# Patient Record
Sex: FEMALE | Race: WHITE | NOT HISPANIC OR LATINO | Employment: OTHER | URBAN - METROPOLITAN AREA
[De-identification: names, ages, dates, MRNs, and addresses within clinical notes are randomized per-mention and may not be internally consistent; named-entity substitution may affect disease eponyms.]

---

## 2019-12-03 ENCOUNTER — OFFICE VISIT (OUTPATIENT)
Dept: FAMILY MEDICINE | Facility: CLINIC | Age: 69
End: 2019-12-03
Payer: MEDICARE

## 2019-12-03 VITALS
DIASTOLIC BLOOD PRESSURE: 64 MMHG | HEART RATE: 75 BPM | TEMPERATURE: 99 F | HEIGHT: 57 IN | OXYGEN SATURATION: 99 % | WEIGHT: 150.38 LBS | BODY MASS INDEX: 32.44 KG/M2 | RESPIRATION RATE: 18 BRPM | SYSTOLIC BLOOD PRESSURE: 110 MMHG

## 2019-12-03 DIAGNOSIS — H25.9 AGE-RELATED CATARACT OF BOTH EYES, UNSPECIFIED AGE-RELATED CATARACT TYPE: Primary | ICD-10-CM

## 2019-12-03 DIAGNOSIS — Z12.31 SCREENING MAMMOGRAM, ENCOUNTER FOR: ICD-10-CM

## 2019-12-03 DIAGNOSIS — Z01.818 PREOP EXAM FOR INTERNAL MEDICINE: ICD-10-CM

## 2019-12-03 PROCEDURE — 1159F PR MEDICATION LIST DOCUMENTED IN MEDICAL RECORD: ICD-10-PCS | Mod: HCNC,S$GLB,, | Performed by: INTERNAL MEDICINE

## 2019-12-03 PROCEDURE — 1159F MED LIST DOCD IN RCRD: CPT | Mod: HCNC,S$GLB,, | Performed by: INTERNAL MEDICINE

## 2019-12-03 PROCEDURE — 1101F PT FALLS ASSESS-DOCD LE1/YR: CPT | Mod: HCNC,CPTII,S$GLB, | Performed by: INTERNAL MEDICINE

## 2019-12-03 PROCEDURE — 99999 PR PBB SHADOW E&M-NEW PATIENT-LVL IV: ICD-10-PCS | Mod: PBBFAC,HCNC,, | Performed by: INTERNAL MEDICINE

## 2019-12-03 PROCEDURE — 1126F PR PAIN SEVERITY QUANTIFIED, NO PAIN PRESENT: ICD-10-PCS | Mod: HCNC,S$GLB,, | Performed by: INTERNAL MEDICINE

## 2019-12-03 PROCEDURE — 1101F PR PT FALLS ASSESS DOC 0-1 FALLS W/OUT INJ PAST YR: ICD-10-PCS | Mod: HCNC,CPTII,S$GLB, | Performed by: INTERNAL MEDICINE

## 2019-12-03 PROCEDURE — 99999 PR PBB SHADOW E&M-NEW PATIENT-LVL IV: CPT | Mod: PBBFAC,HCNC,, | Performed by: INTERNAL MEDICINE

## 2019-12-03 PROCEDURE — 99213 PR OFFICE/OUTPT VISIT, EST, LEVL III, 20-29 MIN: ICD-10-PCS | Mod: HCNC,S$GLB,, | Performed by: INTERNAL MEDICINE

## 2019-12-03 PROCEDURE — 1126F AMNT PAIN NOTED NONE PRSNT: CPT | Mod: HCNC,S$GLB,, | Performed by: INTERNAL MEDICINE

## 2019-12-03 PROCEDURE — 99213 OFFICE O/P EST LOW 20 MIN: CPT | Mod: HCNC,S$GLB,, | Performed by: INTERNAL MEDICINE

## 2019-12-03 RX ORDER — LISINOPRIL 20 MG/1
20 TABLET ORAL DAILY
COMMUNITY
End: 2020-04-29

## 2019-12-03 RX ORDER — AMLODIPINE BESYLATE 5 MG/1
TABLET ORAL DAILY
Refills: 3 | COMMUNITY
Start: 2019-10-31 | End: 2022-02-11 | Stop reason: SDUPTHER

## 2019-12-03 RX ORDER — LATANOPROST 50 UG/ML
SOLUTION/ DROPS OPHTHALMIC
Refills: 0 | COMMUNITY
Start: 2019-09-18

## 2019-12-03 RX ORDER — HYDROGEN PEROXIDE 3 %
20 SOLUTION, NON-ORAL MISCELLANEOUS
COMMUNITY

## 2019-12-03 RX ORDER — CITALOPRAM 20 MG/1
TABLET, FILM COATED ORAL
COMMUNITY
Start: 2017-06-06 | End: 2020-03-25 | Stop reason: SDUPTHER

## 2019-12-03 NOTE — PROGRESS NOTES
[facility] Note    Chief Complaint      Chief Complaint   Patient presents with    Pre-op Exam       History of Present Illness      Merry Terrazas is a 69 y.o. female who presents today for   Chief Complaint   Patient presents with    Pre-op Exam   .  Patient comes to appointment for preop eval for cataract removal with dr mathieu mccann . See precompleted clearence form filled out by me .     Problem List Items Addressed This Visit        Ophtho    Age-related cataract of both eyes - Primary    Overview     Removal with dr mccann preop assessment done             Renal/    Screening mammogram, encounter for    Overview     Ordered             Other    Preop exam for internal medicine    Overview     See precompleted clearence form . Cleared for procedure with low risk cardiac complications                  Past Medical History:  No past medical history on file.    Past Surgical History:  Past Surgical History:   Procedure Laterality Date     SECTION         Family History:  family history includes Hypertension in her maternal grandmother and mother; Stroke in her maternal grandmother.    Social History:  Social History     Socioeconomic History    Marital status:      Spouse name: Not on file    Number of children: Not on file    Years of education: Not on file    Highest education level: Not on file   Occupational History    Not on file   Social Needs    Financial resource strain: Not hard at all    Food insecurity:     Worry: Never true     Inability: Never true    Transportation needs:     Medical: No     Non-medical: No   Tobacco Use    Smoking status: Never Smoker    Smokeless tobacco: Never Used   Substance and Sexual Activity    Alcohol use: Yes     Frequency: 2-4 times a month     Drinks per session: 1 or 2    Drug use: Never    Sexual activity: Yes     Partners: Male   Lifestyle    Physical activity:     Days per week: 5 days     Minutes per  session: 40 min    Stress: Not at all   Relationships    Social connections:     Talks on phone: More than three times a week     Gets together: More than three times a week     Attends Restoration service: Never     Active member of club or organization: No     Attends meetings of clubs or organizations: Never     Relationship status:    Other Topics Concern    Not on file   Social History Narrative    Not on file       Review of Systems:   Review of Systems   Constitutional: Negative for fever and weight loss.   HENT: Negative for congestion, hearing loss and sore throat.    Eyes: Positive for blurred vision.   Respiratory: Negative for cough and shortness of breath.    Cardiovascular: Negative for chest pain, palpitations, claudication and leg swelling.   Gastrointestinal: Negative for abdominal pain, constipation, diarrhea and heartburn.   Genitourinary: Negative for dysuria.   Musculoskeletal: Negative for back pain and myalgias.   Skin: Negative for rash.   Neurological: Negative for focal weakness and headaches.   Psychiatric/Behavioral: Negative for depression and suicidal ideas. The patient is not nervous/anxious.          Medications:  Outpatient Encounter Medications as of 12/3/2019   Medication Sig Dispense Refill    amLODIPine (NORVASC) 5 MG tablet   3    citalopram (CELEXA) 20 MG tablet Take by mouth.      esomeprazole (NEXIUM) 20 MG capsule Take 20 mg by mouth before breakfast.      latanoprost 0.005 % ophthalmic solution   0    lisinopril (PRINIVIL,ZESTRIL) 20 MG tablet Take 20 mg by mouth once daily.      vit C/vit E acet/lutein/min (OCUVITE LUTEIN ORAL) Take by mouth.       No facility-administered encounter medications on file as of 12/3/2019.         Allergies:  Review of patient's allergies indicates:  No Known Allergies      Physical Exam      Vitals:    12/03/19 1104   BP: 110/64   Pulse: 75   Resp: 18   Temp: 98.6 °F (37 °C)      Body mass index is 32.54 kg/m².    Physical Exam    Constitutional: She is oriented to person, place, and time. She appears well-developed and well-nourished.   HENT:   Mouth/Throat: Oropharynx is clear and moist.   Eyes: Pupils are equal, round, and reactive to light. EOM are normal.   Neck: Normal range of motion. No thyromegaly present.   Cardiovascular: Normal rate and normal heart sounds. Exam reveals no gallop and no friction rub.   No murmur heard.  Pulmonary/Chest: Breath sounds normal.   Abdominal: Soft. Bowel sounds are normal.   Musculoskeletal: Normal range of motion.   Lymphadenopathy:     She has no cervical adenopathy.   Neurological: She is alert and oriented to person, place, and time. No cranial nerve deficit.   Skin: Skin is warm. No rash noted.   Psychiatric: She has a normal mood and affect. Her behavior is normal.        Laboratory:  CBC:  No results for input(s): WBC, RBC, HGB, HCT, PLT, MCV, MCH, MCHC in the last 2160 hours.  CMP:  No results for input(s): GLU, CALCIUM, ALBUMIN, PROT, NA, K, CO2, CL, BUN, ALKPHOS, ALT, AST, BILITOT in the last 2160 hours.    Invalid input(s): CREATININ  URINALYSIS:  No results for input(s): COLORU, CLARITYU, SPECGRAV, PHUR, PROTEINUA, GLUCOSEU, BILIRUBINCON, BLOODU, WBCU, RBCU, BACTERIA, MUCUS, NITRITE, LEUKOCYTESUR, UROBILINOGEN, HYALINECASTS in the last 2160 hours.   LIPIDS:  No results for input(s): TSH, HDL, CHOL, TRIG, LDLCALC, CHOLHDL, NONHDLCHOL, TOTALCHOLEST in the last 2160 hours.  TSH:  No results for input(s): TSH in the last 2160 hours.  A1C:  No results for input(s): HGBA1C in the last 2160 hours.    Radiology:        Assessment:     Merry Terrazas is a 69 y.o.female with:    Age-related cataract of both eyes, unspecified age-related cataract type    Preop exam for internal medicine    Screening mammogram, encounter for  -     Mammo Digital Screening Bilat; Future; Expected date: 12/03/2019          Plan:     Problem List Items Addressed This Visit        Ophtho    Age-related cataract of  both eyes - Primary    Overview     Removal with dr mccann preop assessment done             Renal/    Screening mammogram, encounter for    Overview     Ordered             Other    Preop exam for internal medicine    Overview     See precompleted clearence form . Cleared for procedure with low risk cardiac complications                As above, continue current medications and maintain follow up with specialists.  Return to clinic in 3 months.    Frederick W Dantagnan [facility] [place]

## 2020-03-25 RX ORDER — CITALOPRAM 20 MG/1
20 TABLET, FILM COATED ORAL DAILY
Qty: 30 TABLET | Refills: 5 | Status: SHIPPED | OUTPATIENT
Start: 2020-03-25 | End: 2020-10-01 | Stop reason: SDUPTHER

## 2020-04-28 NOTE — TELEPHONE ENCOUNTER
Called patient to see which dose of lisinopril she is taking is it the plain 20 mg or lisinopril-hctz 20-12.5

## 2020-04-29 RX ORDER — LISINOPRIL AND HYDROCHLOROTHIAZIDE 12.5; 2 MG/1; MG/1
1 TABLET ORAL DAILY
Qty: 90 TABLET | Refills: 3 | Status: SHIPPED | OUTPATIENT
Start: 2020-04-29 | End: 2021-04-30

## 2020-05-08 DIAGNOSIS — Z12.11 COLON CANCER SCREENING: ICD-10-CM

## 2020-10-01 RX ORDER — CITALOPRAM 20 MG/1
20 TABLET, FILM COATED ORAL DAILY
Qty: 30 TABLET | Refills: 5 | Status: SHIPPED | OUTPATIENT
Start: 2020-10-01 | End: 2021-04-30

## 2020-11-09 ENCOUNTER — OFFICE VISIT (OUTPATIENT)
Dept: FAMILY MEDICINE | Facility: CLINIC | Age: 70
End: 2020-11-09
Payer: MEDICARE

## 2020-11-09 VITALS
BODY MASS INDEX: 31.99 KG/M2 | DIASTOLIC BLOOD PRESSURE: 84 MMHG | HEART RATE: 77 BPM | WEIGHT: 148.25 LBS | HEIGHT: 57 IN | SYSTOLIC BLOOD PRESSURE: 144 MMHG | TEMPERATURE: 98 F | OXYGEN SATURATION: 99 % | RESPIRATION RATE: 18 BRPM

## 2020-11-09 DIAGNOSIS — Z11.59 ENCOUNTER FOR HEPATITIS C SCREENING TEST FOR LOW RISK PATIENT: ICD-10-CM

## 2020-11-09 DIAGNOSIS — Z13.6 ENCOUNTER FOR LIPID SCREENING FOR CARDIOVASCULAR DISEASE: ICD-10-CM

## 2020-11-09 DIAGNOSIS — Z78.0 POST-MENOPAUSAL: ICD-10-CM

## 2020-11-09 DIAGNOSIS — Z00.00 ANNUAL PHYSICAL EXAM: Primary | ICD-10-CM

## 2020-11-09 DIAGNOSIS — Z13.220 ENCOUNTER FOR LIPID SCREENING FOR CARDIOVASCULAR DISEASE: ICD-10-CM

## 2020-11-09 DIAGNOSIS — I12.9 HYPERTENSION, RENAL DISEASE, STAGE 1-4 OR UNSPECIFIED CHRONIC KIDNEY DISEASE: ICD-10-CM

## 2020-11-09 PROCEDURE — 99397 PR PREVENTIVE VISIT,EST,65 & OVER: ICD-10-PCS | Mod: HCNC,S$GLB,, | Performed by: INTERNAL MEDICINE

## 2020-11-09 PROCEDURE — 99397 PER PM REEVAL EST PAT 65+ YR: CPT | Mod: HCNC,S$GLB,, | Performed by: INTERNAL MEDICINE

## 2020-11-09 PROCEDURE — 99999 PR PBB SHADOW E&M-EST. PATIENT-LVL III: CPT | Mod: PBBFAC,HCNC,, | Performed by: INTERNAL MEDICINE

## 2020-11-09 PROCEDURE — 99999 PR PBB SHADOW E&M-EST. PATIENT-LVL III: ICD-10-PCS | Mod: PBBFAC,HCNC,, | Performed by: INTERNAL MEDICINE

## 2020-11-09 NOTE — PROGRESS NOTES
[facility] Note    Chief Complaint      Chief Complaint   Patient presents with    Annual Exam       History of Present Illness      Merry Terrazas is a 70 y.o. female who presents today for   Chief Complaint   Patient presents with    Annual Exam   .  Patient comes to appointment here for annual [employer] wellness exam . She is currently feeling well. Is seeing dr velasquez gi , dr mariee renal , dr mccann optho , she is completely independent of all adls . She denies difficulty with ambulating both in and out of home . She denies hearting loss , no depression symptoms. She is uptodate with all vaccines . She is uptodate with colonoscopy with dr velasquez. She active with [employer] 4 days week .     Problem List Items Addressed This Visit        Cardiac/Vascular    Encounter for lipid screening for cardiovascular disease    Overview     Check lipid panel today            Renal/    Hypertension, renal disease, stage 1-4 or unspecified chronic kidney disease    Overview     Seeing renal . Cont current meds . bp stable            Other    Annual physical exam - Primary    Overview     pe documented . Will review vaccines . cont all specialist f/u . Will get full labs as well            Other Visit Diagnoses     Post-menopausal        Encounter for hepatitis C screening test for low risk patient                Past Medical History:  History reviewed. No pertinent past medical history.    Past Surgical History:  Past Surgical History:   Procedure Laterality Date     SECTION         Family History:  family history includes Hypertension in her maternal grandmother and mother; Stroke in her maternal grandmother.    Social History:  Social History     Socioeconomic History    Marital status:      Spouse name: Not on file    Number of children: Not on file    Years of education: Not on file    Highest education level: Not on file   Occupational History    Not on file    Social Needs    Financial resource strain: Not hard at all    Food insecurity     Worry: Never true     Inability: Never true    Transportation needs     Medical: No     Non-medical: No   Tobacco Use    Smoking status: Never Smoker    Smokeless tobacco: Never Used   Substance and Sexual Activity    Alcohol use: Yes     Frequency: 2-4 times a month     Drinks per session: 1 or 2    Drug use: Never    Sexual activity: Yes     Partners: Male   Lifestyle    Physical activity     Days per week: 5 days     Minutes per session: 40 min    Stress: Not at all   Relationships    Social connections     Talks on phone: More than three times a week     Gets together: More than three times a week     Attends Buddhist service: Never     Active member of club or organization: No     Attends meetings of clubs or organizations: Never     Relationship status:    Other Topics Concern    Not on file   Social History Narrative    Not on file       Review of Systems:   Review of Systems   Constitutional: Negative for fever and weight loss.   HENT: Negative for congestion, hearing loss and sore throat.    Eyes: Negative for blurred vision.   Respiratory: Negative for cough and shortness of breath.    Cardiovascular: Negative for chest pain, palpitations, claudication and leg swelling.   Gastrointestinal: Negative for abdominal pain, constipation, diarrhea and heartburn.   Genitourinary: Negative for dysuria.   Musculoskeletal: Negative for back pain and myalgias.   Skin: Negative for rash.   Neurological: Negative for focal weakness and headaches.   Psychiatric/Behavioral: Negative for depression and suicidal ideas. The patient is not nervous/anxious.          Medications:  Outpatient Encounter Medications as of 11/9/2020   Medication Sig Dispense Refill    amLODIPine (NORVASC) 5 MG tablet   3    citalopram (CELEXA) 20 MG tablet Take 1 tablet (20 mg total) by mouth once daily. 30 tablet 5    esomeprazole (NEXIUM)  20 MG capsule Take 20 mg by mouth before breakfast.      latanoprost 0.005 % ophthalmic solution   0    lisinopriL-hydrochlorothiazide (PRINZIDE,ZESTORETIC) 20-12.5 mg per tablet Take 1 tablet by mouth once daily. 90 tablet 3    vit C/vit E acet/lutein/min (OCUVITE LUTEIN ORAL) Take by mouth.       No facility-administered encounter medications on file as of 11/9/2020.         Allergies:  Review of patient's allergies indicates:  No Known Allergies      Physical Exam      Vitals:    11/09/20 0956   BP: (!) 144/84   Pulse: 77   Resp: 18   Temp: 97.6 °F (36.4 °C)      Body mass index is 32.08 kg/m².    Physical Exam  Constitutional:       Appearance: She is well-developed.   Eyes:      Pupils: Pupils are equal, round, and reactive to light.   Neck:      Musculoskeletal: Normal range of motion.      Thyroid: No thyromegaly.   Cardiovascular:      Rate and Rhythm: Normal rate.      Heart sounds: Normal heart sounds. No murmur. No friction rub. No gallop.    Pulmonary:      Breath sounds: Normal breath sounds.   Abdominal:      General: Bowel sounds are normal.      Palpations: Abdomen is soft.   Musculoskeletal: Normal range of motion.   Lymphadenopathy:      Cervical: No cervical adenopathy.   Skin:     General: Skin is warm.      Findings: No rash.   Neurological:      Mental Status: She is alert and oriented to person, place, and time.      Cranial Nerves: No cranial nerve deficit.   Psychiatric:         Behavior: Behavior normal.          Laboratory:  CBC:  No results for input(s): WBC, RBC, HGB, HCT, PLT, MCV, MCH, MCHC in the last 2160 hours.  CMP:  No results for input(s): GLU, CALCIUM, ALBUMIN, PROT, NA, K, CO2, CL, BUN, ALKPHOS, ALT, AST, BILITOT in the last 2160 hours.    Invalid input(s): CREATININ  URINALYSIS:  No results for input(s): COLORU, CLARITYU, SPECGRAV, PHUR, PROTEINUA, GLUCOSEU, BILIRUBINCON, BLOODU, WBCU, RBCU, BACTERIA, MUCUS, NITRITE, LEUKOCYTESUR, UROBILINOGEN, HYALINECASTS in the last 2160  hours.   LIPIDS:  No results for input(s): TSH, HDL, CHOL, TRIG, LDLCALC, CHOLHDL, NONHDLCHOL, TOTALCHOLEST in the last 2160 hours.  TSH:  No results for input(s): TSH in the last 2160 hours.  A1C:  No results for input(s): HGBA1C in the last 2160 hours.    Radiology:        Assessment:     Merry Terrazas is a 70 y.o.female with:    Annual physical exam    Hypertension, renal disease, stage 1-4 or unspecified chronic kidney disease  -     CBC Auto Differential; Future; Expected date: 11/09/2020    Encounter for lipid screening for cardiovascular disease  -     Comprehensive Metabolic Panel; Future; Expected date: 11/09/2020  -     Lipid Panel; Future; Expected date: 11/09/2020    Post-menopausal  -     DXA Bone Density Spine And Hip; Future; Expected date: 11/09/2020    Encounter for hepatitis C screening test for low risk patient  -     Hepatitis C Antibody; Future; Expected date: 11/09/2020          Plan:     Problem List Items Addressed This Visit        Cardiac/Vascular    Encounter for lipid screening for cardiovascular disease    Overview     Check lipid panel today            Renal/    Hypertension, renal disease, stage 1-4 or unspecified chronic kidney disease    Overview     Seeing renal . Cont current meds . bp stable            Other    Annual physical exam - Primary    Overview     pe documented . Will review vaccines . cont all specialist f/u . Will get full labs as well            Other Visit Diagnoses     Post-menopausal        Encounter for hepatitis C screening test for low risk patient              As above, continue current medications and maintain follow up with specialists.  Return to clinic in 6  months.      Frederick W Dantagnan [facility] [place]

## 2020-11-18 LAB
ALBUMIN: 4.5 GRAM/DL (ref 3.5–5)
ALP SERPL-CCNC: 51 UNIT/L (ref 38–126)
ALT SERPL W P-5'-P-CCNC: 18 UNIT/L (ref 7–56)
ANION GAP SERPL CALC-SCNC: 17 MEQ/L (ref 9–18)
AST SERPL-CCNC: 21 UNIT/L (ref 7–40)
BASOPHILS ABSOLUTE COUNT: 0 K/UL (ref 0–0.2)
BASOPHILS NFR BLD: 0.6 % (ref 0–2)
BILIRUB SERPL-MCNC: 0.4 MG/DL (ref 0–1.2)
BUN BLD-MCNC: 34 MG/DL (ref 7–21)
BUN/CREAT SERPL: 26 RATIO (ref 6–22)
CALC OSMOLALITY: 285 MOSM/KG (ref 275–295)
CALCIUM SERPL-MCNC: 9.4 MG/DL (ref 8.5–10.3)
CHLORIDE SERPL-SCNC: 104 MEQ/L (ref 98–107)
CHOL/HDLC RATIO: 2
CHOLEST SERPL-MSCNC: 241 MG/DL (ref 100–200)
CO2 SERPL-SCNC: 22 MEQ/L (ref 21–31)
CREAT SERPL-MCNC: 1.3 MG/DL (ref 0.5–1)
DIFFERENTIAL TYPE: ABNORMAL
EOSINOPHIL NFR BLD: 1.6 % (ref 0–4)
EOSINOPHILS ABSOLUTE COUNT: 0.1 K/UL (ref 0–0.7)
ERYTHROCYTE [DISTWIDTH] IN BLOOD BY AUTOMATED COUNT: 14.2 GRAM/DL (ref 12–15.3)
GFR: 41.8 ML/MIN/1.73M2
GLUCOSE SERPL-MCNC: 89 MG/DL (ref 70–100)
HCT VFR BLD AUTO: 37.3 % (ref 37–47)
HDLC SERPL-MCNC: 100 MG/DL (ref 40–75)
HGB BLD-MCNC: 12.3 GRAM/DL (ref 12–16)
LDLC SERPL CALC-MCNC: 131 MG/DL (ref 0–125)
LYMPHOCYTES %: 27.7 % (ref 15–45)
LYMPHOCYTES ABSOLUTE COUNT: 1.3 K/UL (ref 1–4.2)
MCH RBC QN AUTO: 32.4 PICOGRAM (ref 27–33)
MCHC RBC AUTO-ENTMCNC: 33 GRAM/DL (ref 32–36)
MCV RBC AUTO: 98.2 FEMTOLITER (ref 81–99)
MONOCYTES %: 8.7 % (ref 3–13)
MONOCYTES ABSOLUTE COUNT: 0.4 K/UL (ref 0.1–0.8)
NEUTROPHILS ABSOLUTE COUNT: 2.9 K/UL (ref 2.1–7.6)
NEUTROPHILS RELATIVE PERCENT: 61.4 % (ref 32–80)
NONHDLC SERPL-MCNC: 141 MG/DL (ref 60–125)
PLATELET # BLD AUTO: 264 K/UL (ref 150–350)
PMV BLD AUTO: 7.4 FEMTOLITER (ref 7–10.2)
POTASSIUM SERPL-SCNC: 4.4 MEQ/L (ref 3.5–5)
RBC # BLD AUTO: 3.8 MIL/UL (ref 4.2–5.4)
SODIUM BLD-SCNC: 139 MEQ/L (ref 135–145)
TOTAL PROTEIN: 7.3 GRAM/DL (ref 6.3–8.2)
TRIGL SERPL-MCNC: 64 MG/DL (ref 30–150)
WBC # BLD AUTO: 4.7 K/UL (ref 4.5–11)

## 2020-11-19 LAB
HCV AB S/CO SERPL IA: 0.03
HCV AB SERPL QL IA: NORMAL

## 2020-12-17 ENCOUNTER — PATIENT OUTREACH (OUTPATIENT)
Dept: ADMINISTRATIVE | Facility: HOSPITAL | Age: 70
End: 2020-12-17

## 2020-12-17 ENCOUNTER — TELEPHONE (OUTPATIENT)
Dept: ADMINISTRATIVE | Facility: HOSPITAL | Age: 70
End: 2020-12-17

## 2021-02-08 PROBLEM — Z00.00 ANNUAL PHYSICAL EXAM: Status: RESOLVED | Noted: 2020-11-09 | Resolved: 2021-02-08

## 2021-02-08 PROBLEM — Z13.220 ENCOUNTER FOR LIPID SCREENING FOR CARDIOVASCULAR DISEASE: Status: RESOLVED | Noted: 2020-11-09 | Resolved: 2021-02-08

## 2021-02-08 PROBLEM — Z13.6 ENCOUNTER FOR LIPID SCREENING FOR CARDIOVASCULAR DISEASE: Status: RESOLVED | Noted: 2020-11-09 | Resolved: 2021-02-08

## 2021-04-05 ENCOUNTER — PATIENT MESSAGE (OUTPATIENT)
Dept: ADMINISTRATIVE | Facility: HOSPITAL | Age: 71
End: 2021-04-05

## 2021-04-13 ENCOUNTER — PES CALL (OUTPATIENT)
Dept: ADMINISTRATIVE | Facility: CLINIC | Age: 71
End: 2021-04-13

## 2021-04-16 ENCOUNTER — PATIENT MESSAGE (OUTPATIENT)
Dept: RESEARCH | Facility: HOSPITAL | Age: 71
End: 2021-04-16

## 2021-04-26 ENCOUNTER — PATIENT OUTREACH (OUTPATIENT)
Dept: ADMINISTRATIVE | Facility: HOSPITAL | Age: 71
End: 2021-04-26

## 2021-04-26 ENCOUNTER — TELEPHONE (OUTPATIENT)
Dept: ADMINISTRATIVE | Facility: HOSPITAL | Age: 71
End: 2021-04-26

## 2021-04-26 DIAGNOSIS — Z12.31 SCREENING MAMMOGRAM, ENCOUNTER FOR: Primary | ICD-10-CM

## 2021-05-03 ENCOUNTER — APPOINTMENT (OUTPATIENT)
Dept: RADIOLOGY | Facility: CLINIC | Age: 71
End: 2021-05-03
Attending: INTERNAL MEDICINE
Payer: MEDICARE

## 2021-05-03 DIAGNOSIS — Z78.0 POST-MENOPAUSAL: ICD-10-CM

## 2021-05-03 PROCEDURE — 77080 DXA BONE DENSITY AXIAL: CPT | Mod: 26,,, | Performed by: INTERNAL MEDICINE

## 2021-05-03 PROCEDURE — 77080 DEXA BONE DENSITY SPINE HIP: ICD-10-PCS | Mod: 26,,, | Performed by: INTERNAL MEDICINE

## 2021-05-03 PROCEDURE — 77080 DXA BONE DENSITY AXIAL: CPT | Mod: TC,PO

## 2021-05-10 ENCOUNTER — OFFICE VISIT (OUTPATIENT)
Dept: FAMILY MEDICINE | Facility: CLINIC | Age: 71
End: 2021-05-10
Payer: MEDICARE

## 2021-05-10 VITALS
HEIGHT: 57 IN | WEIGHT: 146.19 LBS | DIASTOLIC BLOOD PRESSURE: 80 MMHG | HEART RATE: 65 BPM | BODY MASS INDEX: 31.54 KG/M2 | RESPIRATION RATE: 18 BRPM | SYSTOLIC BLOOD PRESSURE: 118 MMHG | OXYGEN SATURATION: 95 % | TEMPERATURE: 98 F

## 2021-05-10 DIAGNOSIS — I12.9 HYPERTENSION, RENAL DISEASE, STAGE 1-4 OR UNSPECIFIED CHRONIC KIDNEY DISEASE: ICD-10-CM

## 2021-05-10 DIAGNOSIS — F33.0 DEPRESSION, MAJOR, RECURRENT, MILD: Primary | ICD-10-CM

## 2021-05-10 PROCEDURE — 99999 PR PBB SHADOW E&M-EST. PATIENT-LVL III: ICD-10-PCS | Mod: PBBFAC,,, | Performed by: INTERNAL MEDICINE

## 2021-05-10 PROCEDURE — 3288F PR FALLS RISK ASSESSMENT DOCUMENTED: ICD-10-PCS | Mod: CPTII,S$GLB,, | Performed by: INTERNAL MEDICINE

## 2021-05-10 PROCEDURE — 99499 RISK ADDL DX/OHS AUDIT: ICD-10-PCS | Mod: S$GLB,,, | Performed by: INTERNAL MEDICINE

## 2021-05-10 PROCEDURE — 99214 PR OFFICE/OUTPT VISIT, EST, LEVL IV, 30-39 MIN: ICD-10-PCS | Mod: S$GLB,,, | Performed by: INTERNAL MEDICINE

## 2021-05-10 PROCEDURE — 99499 UNLISTED E&M SERVICE: CPT | Mod: S$GLB,,, | Performed by: INTERNAL MEDICINE

## 2021-05-10 PROCEDURE — 1126F AMNT PAIN NOTED NONE PRSNT: CPT | Mod: S$GLB,,, | Performed by: INTERNAL MEDICINE

## 2021-05-10 PROCEDURE — 1101F PR PT FALLS ASSESS DOC 0-1 FALLS W/OUT INJ PAST YR: ICD-10-PCS | Mod: CPTII,S$GLB,, | Performed by: INTERNAL MEDICINE

## 2021-05-10 PROCEDURE — 3288F FALL RISK ASSESSMENT DOCD: CPT | Mod: CPTII,S$GLB,, | Performed by: INTERNAL MEDICINE

## 2021-05-10 PROCEDURE — 3008F BODY MASS INDEX DOCD: CPT | Mod: CPTII,S$GLB,, | Performed by: INTERNAL MEDICINE

## 2021-05-10 PROCEDURE — 1159F MED LIST DOCD IN RCRD: CPT | Mod: S$GLB,,, | Performed by: INTERNAL MEDICINE

## 2021-05-10 PROCEDURE — 1159F PR MEDICATION LIST DOCUMENTED IN MEDICAL RECORD: ICD-10-PCS | Mod: S$GLB,,, | Performed by: INTERNAL MEDICINE

## 2021-05-10 PROCEDURE — 3008F PR BODY MASS INDEX (BMI) DOCUMENTED: ICD-10-PCS | Mod: CPTII,S$GLB,, | Performed by: INTERNAL MEDICINE

## 2021-05-10 PROCEDURE — 99999 PR PBB SHADOW E&M-EST. PATIENT-LVL III: CPT | Mod: PBBFAC,,, | Performed by: INTERNAL MEDICINE

## 2021-05-10 PROCEDURE — 1126F PR PAIN SEVERITY QUANTIFIED, NO PAIN PRESENT: ICD-10-PCS | Mod: S$GLB,,, | Performed by: INTERNAL MEDICINE

## 2021-05-10 PROCEDURE — 1101F PT FALLS ASSESS-DOCD LE1/YR: CPT | Mod: CPTII,S$GLB,, | Performed by: INTERNAL MEDICINE

## 2021-05-10 PROCEDURE — 99214 OFFICE O/P EST MOD 30 MIN: CPT | Mod: S$GLB,,, | Performed by: INTERNAL MEDICINE

## 2021-05-10 RX ORDER — CITALOPRAM 20 MG/1
20 TABLET, FILM COATED ORAL DAILY
Qty: 90 TABLET | Refills: 3 | Status: SHIPPED | OUTPATIENT
Start: 2021-05-10 | End: 2021-11-02

## 2021-07-07 ENCOUNTER — PATIENT MESSAGE (OUTPATIENT)
Dept: ADMINISTRATIVE | Facility: HOSPITAL | Age: 71
End: 2021-07-07

## 2021-07-08 ENCOUNTER — PATIENT OUTREACH (OUTPATIENT)
Dept: ADMINISTRATIVE | Facility: HOSPITAL | Age: 71
End: 2021-07-08

## 2021-07-08 ENCOUNTER — TELEPHONE (OUTPATIENT)
Dept: ADMINISTRATIVE | Facility: HOSPITAL | Age: 71
End: 2021-07-08

## 2021-07-26 ENCOUNTER — OFFICE VISIT (OUTPATIENT)
Dept: URGENT CARE | Facility: CLINIC | Age: 71
End: 2021-07-26
Payer: MEDICARE

## 2021-07-26 PROCEDURE — 99499 NO LOS: ICD-10-PCS | Mod: S$GLB,,, | Performed by: STUDENT IN AN ORGANIZED HEALTH CARE EDUCATION/TRAINING PROGRAM

## 2021-07-26 PROCEDURE — 99499 UNLISTED E&M SERVICE: CPT | Mod: S$GLB,,, | Performed by: STUDENT IN AN ORGANIZED HEALTH CARE EDUCATION/TRAINING PROGRAM

## 2021-08-02 ENCOUNTER — HOSPITAL ENCOUNTER (OUTPATIENT)
Dept: PREADMISSION TESTING | Facility: OTHER | Age: 71
Discharge: HOME | End: 2021-08-02
Attending: ANESTHESIOLOGY
Payer: MEDICARE

## 2021-08-02 ENCOUNTER — ANESTHESIA EVENT (OUTPATIENT)
Dept: SURGERY | Facility: OTHER | Age: 71
End: 2021-08-02
Payer: MEDICARE

## 2021-08-02 ENCOUNTER — HOSPITAL ENCOUNTER (OUTPATIENT)
Dept: PREADMISSION TESTING | Facility: OTHER | Age: 71
Discharge: HOME | End: 2021-08-02
Attending: ORTHOPAEDIC SURGERY
Payer: MEDICARE

## 2021-08-02 VITALS
OXYGEN SATURATION: 99 % | TEMPERATURE: 99 F | HEIGHT: 60 IN | BODY MASS INDEX: 28.47 KG/M2 | WEIGHT: 145 LBS | HEART RATE: 80 BPM | SYSTOLIC BLOOD PRESSURE: 149 MMHG | DIASTOLIC BLOOD PRESSURE: 63 MMHG

## 2021-08-02 DIAGNOSIS — Z01.818 PRE-OP TESTING: ICD-10-CM

## 2021-08-02 LAB — SARS-COV-2 RDRP RESP QL NAA+PROBE: NEGATIVE

## 2021-08-02 PROCEDURE — U0002 COVID-19 LAB TEST NON-CDC: HCPCS | Performed by: ANESTHESIOLOGY

## 2021-08-02 RX ORDER — CHOLECALCIFEROL (VITAMIN D3) 25 MCG
1000 TABLET ORAL DAILY
COMMUNITY

## 2021-08-02 RX ORDER — SODIUM CHLORIDE, SODIUM LACTATE, POTASSIUM CHLORIDE, CALCIUM CHLORIDE 600; 310; 30; 20 MG/100ML; MG/100ML; MG/100ML; MG/100ML
INJECTION, SOLUTION INTRAVENOUS CONTINUOUS
Status: CANCELLED | OUTPATIENT
Start: 2021-08-02

## 2021-08-04 ENCOUNTER — ANESTHESIA (OUTPATIENT)
Dept: SURGERY | Facility: OTHER | Age: 71
End: 2021-08-04
Payer: MEDICARE

## 2021-08-04 ENCOUNTER — HOSPITAL ENCOUNTER (OUTPATIENT)
Facility: OTHER | Age: 71
Discharge: HOME | End: 2021-08-04
Attending: ORTHOPAEDIC SURGERY | Admitting: ORTHOPAEDIC SURGERY
Payer: MEDICARE

## 2021-08-04 VITALS
SYSTOLIC BLOOD PRESSURE: 132 MMHG | BODY MASS INDEX: 28.47 KG/M2 | OXYGEN SATURATION: 98 % | HEART RATE: 86 BPM | HEIGHT: 60 IN | RESPIRATION RATE: 18 BRPM | TEMPERATURE: 99 F | DIASTOLIC BLOOD PRESSURE: 59 MMHG | WEIGHT: 145 LBS

## 2021-08-04 DIAGNOSIS — S52.022A CLOSED FRACTURE OF OLECRANON PROCESS OF LEFT ULNA, INITIAL ENCOUNTER: Primary | ICD-10-CM

## 2021-08-04 DIAGNOSIS — S52.022A CLOSED FRACTURE OF LEFT OLECRANON PROCESS: ICD-10-CM

## 2021-08-04 DIAGNOSIS — Z01.818 PRE-OP TESTING: ICD-10-CM

## 2021-08-04 PROCEDURE — 63600175 PHARM REV CODE 636 W HCPCS: Performed by: ANESTHESIOLOGY

## 2021-08-04 PROCEDURE — 37000008 HC ANESTHESIA 1ST 15 MINUTES: Performed by: ORTHOPAEDIC SURGERY

## 2021-08-04 PROCEDURE — C1713 ANCHOR/SCREW BN/BN,TIS/BN: HCPCS | Performed by: ORTHOPAEDIC SURGERY

## 2021-08-04 PROCEDURE — 25000003 PHARM REV CODE 250: Performed by: STUDENT IN AN ORGANIZED HEALTH CARE EDUCATION/TRAINING PROGRAM

## 2021-08-04 PROCEDURE — 71000033 HC RECOVERY, INTIAL HOUR: Performed by: ORTHOPAEDIC SURGERY

## 2021-08-04 PROCEDURE — 27201423 OPTIME MED/SURG SUP & DEVICES STERILE SUPPLY: Performed by: ORTHOPAEDIC SURGERY

## 2021-08-04 PROCEDURE — C1769 GUIDE WIRE: HCPCS | Performed by: ORTHOPAEDIC SURGERY

## 2021-08-04 PROCEDURE — 63600175 PHARM REV CODE 636 W HCPCS: Performed by: ORTHOPAEDIC SURGERY

## 2021-08-04 PROCEDURE — 63600175 PHARM REV CODE 636 W HCPCS: Performed by: STUDENT IN AN ORGANIZED HEALTH CARE EDUCATION/TRAINING PROGRAM

## 2021-08-04 PROCEDURE — 71000039 HC RECOVERY, EACH ADD'L HOUR: Performed by: ORTHOPAEDIC SURGERY

## 2021-08-04 PROCEDURE — 37000009 HC ANESTHESIA EA ADD 15 MINS: Performed by: ORTHOPAEDIC SURGERY

## 2021-08-04 PROCEDURE — 71000015 HC POSTOP RECOV 1ST HR: Performed by: ORTHOPAEDIC SURGERY

## 2021-08-04 PROCEDURE — 25000003 PHARM REV CODE 250: Performed by: ANESTHESIOLOGY

## 2021-08-04 PROCEDURE — 36000710: Performed by: ORTHOPAEDIC SURGERY

## 2021-08-04 PROCEDURE — 36000711: Performed by: ORTHOPAEDIC SURGERY

## 2021-08-04 PROCEDURE — 25000003 PHARM REV CODE 250: Performed by: ORTHOPAEDIC SURGERY

## 2021-08-04 RX ORDER — OXYCODONE HYDROCHLORIDE 5 MG/1
5 TABLET ORAL
Status: DISCONTINUED | OUTPATIENT
Start: 2021-08-04 | End: 2021-08-04 | Stop reason: HOSPADM

## 2021-08-04 RX ORDER — KETOROLAC TROMETHAMINE 30 MG/ML
30 INJECTION, SOLUTION INTRAMUSCULAR; INTRAVENOUS ONCE
Status: COMPLETED | OUTPATIENT
Start: 2021-08-04 | End: 2021-08-04

## 2021-08-04 RX ORDER — HYDROMORPHONE HYDROCHLORIDE 2 MG/ML
0.4 INJECTION, SOLUTION INTRAMUSCULAR; INTRAVENOUS; SUBCUTANEOUS EVERY 5 MIN PRN
Status: DISCONTINUED | OUTPATIENT
Start: 2021-08-04 | End: 2021-08-04 | Stop reason: HOSPADM

## 2021-08-04 RX ORDER — SODIUM CHLORIDE 0.9 % (FLUSH) 0.9 %
3 SYRINGE (ML) INJECTION
Status: DISCONTINUED | OUTPATIENT
Start: 2021-08-04 | End: 2021-08-04 | Stop reason: HOSPADM

## 2021-08-04 RX ORDER — ONDANSETRON 2 MG/ML
INJECTION INTRAMUSCULAR; INTRAVENOUS
Status: DISCONTINUED | OUTPATIENT
Start: 2021-08-04 | End: 2021-08-04

## 2021-08-04 RX ORDER — MIDAZOLAM HYDROCHLORIDE 1 MG/ML
INJECTION INTRAMUSCULAR; INTRAVENOUS
Status: DISCONTINUED | OUTPATIENT
Start: 2021-08-04 | End: 2021-08-04

## 2021-08-04 RX ORDER — PROCHLORPERAZINE EDISYLATE 5 MG/ML
5 INJECTION INTRAMUSCULAR; INTRAVENOUS EVERY 30 MIN PRN
Status: DISCONTINUED | OUTPATIENT
Start: 2021-08-04 | End: 2021-08-04 | Stop reason: HOSPADM

## 2021-08-04 RX ORDER — MEPERIDINE HYDROCHLORIDE 25 MG/ML
12.5 INJECTION INTRAMUSCULAR; INTRAVENOUS; SUBCUTANEOUS ONCE AS NEEDED
Status: DISCONTINUED | OUTPATIENT
Start: 2021-08-04 | End: 2021-08-04 | Stop reason: HOSPADM

## 2021-08-04 RX ORDER — FENTANYL CITRATE 50 UG/ML
INJECTION, SOLUTION INTRAMUSCULAR; INTRAVENOUS
Status: DISCONTINUED | OUTPATIENT
Start: 2021-08-04 | End: 2021-08-04

## 2021-08-04 RX ORDER — BUPIVACAINE HYDROCHLORIDE AND EPINEPHRINE 5; 5 MG/ML; UG/ML
INJECTION, SOLUTION EPIDURAL; INTRACAUDAL; PERINEURAL
Status: DISCONTINUED | OUTPATIENT
Start: 2021-08-04 | End: 2021-08-04 | Stop reason: HOSPADM

## 2021-08-04 RX ORDER — SODIUM CHLORIDE, SODIUM LACTATE, POTASSIUM CHLORIDE, CALCIUM CHLORIDE 600; 310; 30; 20 MG/100ML; MG/100ML; MG/100ML; MG/100ML
INJECTION, SOLUTION INTRAVENOUS CONTINUOUS
Status: DISCONTINUED | OUTPATIENT
Start: 2021-08-04 | End: 2021-08-04 | Stop reason: HOSPADM

## 2021-08-04 RX ORDER — CEFAZOLIN SODIUM 1 G/3ML
2 INJECTION, POWDER, FOR SOLUTION INTRAMUSCULAR; INTRAVENOUS
Status: COMPLETED | OUTPATIENT
Start: 2021-08-04 | End: 2021-08-04

## 2021-08-04 RX ORDER — DEXAMETHASONE SODIUM PHOSPHATE 4 MG/ML
INJECTION, SOLUTION INTRA-ARTICULAR; INTRALESIONAL; INTRAMUSCULAR; INTRAVENOUS; SOFT TISSUE
Status: DISCONTINUED | OUTPATIENT
Start: 2021-08-04 | End: 2021-08-04

## 2021-08-04 RX ORDER — PHENYLEPHRINE HYDROCHLORIDE 10 MG/ML
INJECTION INTRAVENOUS
Status: DISCONTINUED | OUTPATIENT
Start: 2021-08-04 | End: 2021-08-04

## 2021-08-04 RX ORDER — FENTANYL CITRATE 50 UG/ML
100 INJECTION, SOLUTION INTRAMUSCULAR; INTRAVENOUS EVERY 5 MIN PRN
Status: DISCONTINUED | OUTPATIENT
Start: 2021-08-04 | End: 2021-08-04 | Stop reason: HOSPADM

## 2021-08-04 RX ORDER — PROPOFOL 10 MG/ML
VIAL (ML) INTRAVENOUS
Status: DISCONTINUED | OUTPATIENT
Start: 2021-08-04 | End: 2021-08-04

## 2021-08-04 RX ORDER — MIDAZOLAM HYDROCHLORIDE 1 MG/ML
5 INJECTION INTRAMUSCULAR; INTRAVENOUS ONCE
Status: DISCONTINUED | OUTPATIENT
Start: 2021-08-04 | End: 2021-08-04 | Stop reason: HOSPADM

## 2021-08-04 RX ORDER — HYDROCODONE BITARTRATE AND ACETAMINOPHEN 5; 325 MG/1; MG/1
1 TABLET ORAL EVERY 4 HOURS PRN
Qty: 15 TABLET | Refills: 0 | Status: SHIPPED | OUTPATIENT
Start: 2021-08-04 | End: 2021-11-02

## 2021-08-04 RX ADMIN — GLYCOPYRROLATE 0.2 MG: 0.2 INJECTION, SOLUTION INTRAMUSCULAR; INTRAVITREAL at 12:08

## 2021-08-04 RX ADMIN — PHENYLEPHRINE HYDROCHLORIDE 80 MCG: 10 INJECTION INTRAVENOUS at 01:08

## 2021-08-04 RX ADMIN — GLYCOPYRROLATE 0.1 MG: 0.2 INJECTION, SOLUTION INTRAMUSCULAR; INTRAVITREAL at 12:08

## 2021-08-04 RX ADMIN — PHENYLEPHRINE HYDROCHLORIDE 80 MCG: 10 INJECTION INTRAVENOUS at 12:08

## 2021-08-04 RX ADMIN — HYDROMORPHONE HYDROCHLORIDE 0.4 MG: 2 INJECTION INTRAMUSCULAR; INTRAVENOUS; SUBCUTANEOUS at 02:08

## 2021-08-04 RX ADMIN — DEXAMETHASONE SODIUM PHOSPHATE 8 MG: 4 INJECTION, SOLUTION INTRAMUSCULAR; INTRAVENOUS at 12:08

## 2021-08-04 RX ADMIN — MIDAZOLAM HYDROCHLORIDE 2 MG: 1 INJECTION, SOLUTION INTRAMUSCULAR; INTRAVENOUS at 12:08

## 2021-08-04 RX ADMIN — FENTANYL CITRATE 100 MCG: 50 INJECTION, SOLUTION INTRAMUSCULAR; INTRAVENOUS at 12:08

## 2021-08-04 RX ADMIN — OXYCODONE HYDROCHLORIDE 5 MG: 5 TABLET ORAL at 02:08

## 2021-08-04 RX ADMIN — FENTANYL CITRATE 50 MCG: 50 INJECTION, SOLUTION INTRAMUSCULAR; INTRAVENOUS at 12:08

## 2021-08-04 RX ADMIN — SODIUM CHLORIDE, SODIUM LACTATE, POTASSIUM CHLORIDE, AND CALCIUM CHLORIDE: 600; 310; 30; 20 INJECTION, SOLUTION INTRAVENOUS at 11:08

## 2021-08-04 RX ADMIN — FENTANYL CITRATE 25 MCG: 50 INJECTION, SOLUTION INTRAMUSCULAR; INTRAVENOUS at 01:08

## 2021-08-04 RX ADMIN — CEFAZOLIN 2 G: 330 INJECTION, POWDER, FOR SOLUTION INTRAMUSCULAR; INTRAVENOUS at 12:08

## 2021-08-04 RX ADMIN — ONDANSETRON HYDROCHLORIDE 4 MG: 2 INJECTION INTRAMUSCULAR; INTRAVENOUS at 12:08

## 2021-08-04 RX ADMIN — FENTANYL CITRATE 50 MCG: 50 INJECTION, SOLUTION INTRAMUSCULAR; INTRAVENOUS at 01:08

## 2021-08-04 RX ADMIN — PROPOFOL 200 MG: 10 INJECTION, EMULSION INTRAVENOUS at 12:08

## 2021-08-04 RX ADMIN — KETOROLAC TROMETHAMINE 30 MG: 30 INJECTION, SOLUTION INTRAMUSCULAR; INTRAVENOUS at 02:08

## 2021-08-16 ENCOUNTER — IMMUNIZATION (OUTPATIENT)
Dept: PHARMACY | Facility: CLINIC | Age: 71
End: 2021-08-16
Payer: MEDICARE

## 2021-08-16 DIAGNOSIS — Z23 NEED FOR VACCINATION: Primary | ICD-10-CM

## 2021-10-05 ENCOUNTER — PATIENT MESSAGE (OUTPATIENT)
Dept: ADMINISTRATIVE | Facility: HOSPITAL | Age: 71
End: 2021-10-05

## 2021-11-02 ENCOUNTER — OFFICE VISIT (OUTPATIENT)
Dept: FAMILY MEDICINE | Facility: CLINIC | Age: 71
End: 2021-11-02
Payer: MEDICARE

## 2021-11-02 VITALS
HEART RATE: 80 BPM | BODY MASS INDEX: 29.89 KG/M2 | TEMPERATURE: 98 F | OXYGEN SATURATION: 96 % | RESPIRATION RATE: 18 BRPM | SYSTOLIC BLOOD PRESSURE: 128 MMHG | DIASTOLIC BLOOD PRESSURE: 80 MMHG | WEIGHT: 152.25 LBS | HEIGHT: 60 IN

## 2021-11-02 DIAGNOSIS — F33.0 DEPRESSION, MAJOR, RECURRENT, MILD: ICD-10-CM

## 2021-11-02 DIAGNOSIS — I12.9 HYPERTENSION, RENAL DISEASE, STAGE 1-4 OR UNSPECIFIED CHRONIC KIDNEY DISEASE: ICD-10-CM

## 2021-11-02 DIAGNOSIS — E78.5 BORDERLINE HYPERLIPIDEMIA: ICD-10-CM

## 2021-11-02 DIAGNOSIS — Z00.00 ANNUAL PHYSICAL EXAM: Primary | ICD-10-CM

## 2021-11-02 PROCEDURE — 1160F RVW MEDS BY RX/DR IN RCRD: CPT | Mod: HCNC,CPTII,S$GLB, | Performed by: INTERNAL MEDICINE

## 2021-11-02 PROCEDURE — 4010F ACE/ARB THERAPY RXD/TAKEN: CPT | Mod: HCNC,CPTII,S$GLB, | Performed by: INTERNAL MEDICINE

## 2021-11-02 PROCEDURE — 3288F FALL RISK ASSESSMENT DOCD: CPT | Mod: HCNC,CPTII,S$GLB, | Performed by: INTERNAL MEDICINE

## 2021-11-02 PROCEDURE — 4010F PR ACE/ARB THEARPY RXD/TAKEN: ICD-10-PCS | Mod: HCNC,CPTII,S$GLB, | Performed by: INTERNAL MEDICINE

## 2021-11-02 PROCEDURE — 1159F MED LIST DOCD IN RCRD: CPT | Mod: HCNC,CPTII,S$GLB, | Performed by: INTERNAL MEDICINE

## 2021-11-02 PROCEDURE — 3008F PR BODY MASS INDEX (BMI) DOCUMENTED: ICD-10-PCS | Mod: HCNC,CPTII,S$GLB, | Performed by: INTERNAL MEDICINE

## 2021-11-02 PROCEDURE — 3008F BODY MASS INDEX DOCD: CPT | Mod: HCNC,CPTII,S$GLB, | Performed by: INTERNAL MEDICINE

## 2021-11-02 PROCEDURE — 99499 RISK ADDL DX/OHS AUDIT: ICD-10-PCS | Mod: S$GLB,,, | Performed by: INTERNAL MEDICINE

## 2021-11-02 PROCEDURE — 1101F PT FALLS ASSESS-DOCD LE1/YR: CPT | Mod: HCNC,CPTII,S$GLB, | Performed by: INTERNAL MEDICINE

## 2021-11-02 PROCEDURE — 3079F DIAST BP 80-89 MM HG: CPT | Mod: HCNC,CPTII,S$GLB, | Performed by: INTERNAL MEDICINE

## 2021-11-02 PROCEDURE — 99999 PR PBB SHADOW E&M-EST. PATIENT-LVL III: ICD-10-PCS | Mod: PBBFAC,HCNC,, | Performed by: INTERNAL MEDICINE

## 2021-11-02 PROCEDURE — 99397 PER PM REEVAL EST PAT 65+ YR: CPT | Mod: HCNC,S$GLB,, | Performed by: INTERNAL MEDICINE

## 2021-11-02 PROCEDURE — 3074F SYST BP LT 130 MM HG: CPT | Mod: HCNC,CPTII,S$GLB, | Performed by: INTERNAL MEDICINE

## 2021-11-02 PROCEDURE — 1159F PR MEDICATION LIST DOCUMENTED IN MEDICAL RECORD: ICD-10-PCS | Mod: HCNC,CPTII,S$GLB, | Performed by: INTERNAL MEDICINE

## 2021-11-02 PROCEDURE — 99499 UNLISTED E&M SERVICE: CPT | Mod: S$GLB,,, | Performed by: INTERNAL MEDICINE

## 2021-11-02 PROCEDURE — 99397 PR PREVENTIVE VISIT,EST,65 & OVER: ICD-10-PCS | Mod: HCNC,S$GLB,, | Performed by: INTERNAL MEDICINE

## 2021-11-02 PROCEDURE — 1160F PR REVIEW ALL MEDS BY PRESCRIBER/CLIN PHARMACIST DOCUMENTED: ICD-10-PCS | Mod: HCNC,CPTII,S$GLB, | Performed by: INTERNAL MEDICINE

## 2021-11-02 PROCEDURE — 3074F PR MOST RECENT SYSTOLIC BLOOD PRESSURE < 130 MM HG: ICD-10-PCS | Mod: HCNC,CPTII,S$GLB, | Performed by: INTERNAL MEDICINE

## 2021-11-02 PROCEDURE — 3079F PR MOST RECENT DIASTOLIC BLOOD PRESSURE 80-89 MM HG: ICD-10-PCS | Mod: HCNC,CPTII,S$GLB, | Performed by: INTERNAL MEDICINE

## 2021-11-02 PROCEDURE — 99999 PR PBB SHADOW E&M-EST. PATIENT-LVL III: CPT | Mod: PBBFAC,HCNC,, | Performed by: INTERNAL MEDICINE

## 2021-11-02 PROCEDURE — 1126F PR PAIN SEVERITY QUANTIFIED, NO PAIN PRESENT: ICD-10-PCS | Mod: HCNC,CPTII,S$GLB, | Performed by: INTERNAL MEDICINE

## 2021-11-02 PROCEDURE — 1126F AMNT PAIN NOTED NONE PRSNT: CPT | Mod: HCNC,CPTII,S$GLB, | Performed by: INTERNAL MEDICINE

## 2021-11-02 PROCEDURE — 3288F PR FALLS RISK ASSESSMENT DOCUMENTED: ICD-10-PCS | Mod: HCNC,CPTII,S$GLB, | Performed by: INTERNAL MEDICINE

## 2021-11-02 PROCEDURE — 1101F PR PT FALLS ASSESS DOC 0-1 FALLS W/OUT INJ PAST YR: ICD-10-PCS | Mod: HCNC,CPTII,S$GLB, | Performed by: INTERNAL MEDICINE

## 2021-11-02 RX ORDER — CITALOPRAM 20 MG/1
30 TABLET, FILM COATED ORAL DAILY
Qty: 90 TABLET | Refills: 3 | Status: SHIPPED | OUTPATIENT
Start: 2021-11-02 | End: 2022-08-22

## 2021-11-17 LAB
ALBUMIN: 4.5 GRAM/DL (ref 3.5–5)
ALP SERPL-CCNC: 57 UNIT/L (ref 38–126)
ALT SERPL W P-5'-P-CCNC: <10 UNIT/L (ref 7–56)
ANION GAP SERPL CALC-SCNC: 25 MEQ/L (ref 9–18)
AST SERPL-CCNC: 15 UNIT/L (ref 7–40)
BASOPHILS ABSOLUTE COUNT: 0 K/UL (ref 0–0.2)
BASOPHILS NFR BLD: 1.1 % (ref 0–2)
BILIRUB SERPL-MCNC: 0.4 MG/DL (ref 0–1.2)
BUN BLD-MCNC: 39 MG/DL (ref 7–21)
BUN/CREAT SERPL: 30 RATIO (ref 6–22)
CALC OSMOLALITY: 292 MOSM/KG (ref 275–295)
CALCIUM SERPL-MCNC: 9.4 MG/DL (ref 8.5–10.3)
CHLORIDE SERPL-SCNC: 99 MEQ/L (ref 98–107)
CHOL/HDLC RATIO: 2
CHOLEST SERPL-MSCNC: 243 MG/DL (ref 100–200)
CO2 SERPL-SCNC: 23 MEQ/L (ref 21–31)
CREAT SERPL-MCNC: 1.3 MG/DL (ref 0.5–1)
DIFFERENTIAL TYPE: ABNORMAL
EOSINOPHIL NFR BLD: 3.3 % (ref 0–4)
EOSINOPHILS ABSOLUTE COUNT: 0.1 K/UL (ref 0–0.7)
ERYTHROCYTE [DISTWIDTH] IN BLOOD BY AUTOMATED COUNT: 14.4 % (ref 12–15.3)
GFR: 39.6 ML/MIN/1.73M2
GLUCOSE SERPL-MCNC: 86 MG/DL (ref 70–100)
HCT VFR BLD AUTO: 38.9 % (ref 37–47)
HDLC SERPL-MCNC: 101 MG/DL (ref 40–75)
HGB BLD-MCNC: 13 GRAM/DL (ref 12–16)
LDLC SERPL CALC-MCNC: 130 MG/DL (ref 0–125)
LYMPHOCYTES %: 41.8 % (ref 15–45)
LYMPHOCYTES ABSOLUTE COUNT: 1.9 K/UL (ref 1–4.2)
MCH RBC QN AUTO: 32.7 PICOGRAM (ref 27–33)
MCHC RBC AUTO-ENTMCNC: 33.5 GRAM/DL (ref 32–36)
MCV RBC AUTO: 97.7 FEMTOLITER (ref 81–99)
MONOCYTES %: 11.7 % (ref 3–13)
MONOCYTES ABSOLUTE COUNT: 0.5 K/UL (ref 0.1–0.8)
NEUTROPHILS ABSOLUTE COUNT: 1.9 K/UL (ref 2.1–7.6)
NEUTROPHILS RELATIVE PERCENT: 42.1 % (ref 32–80)
NONHDLC SERPL-MCNC: 142 MG/DL (ref 60–125)
PLATELET # BLD AUTO: 311 K/UL (ref 150–350)
PMV BLD AUTO: 7.3 FEMTOLITER (ref 7–10.2)
POTASSIUM SERPL-SCNC: 5.2 MEQ/L (ref 3.5–5)
RBC # BLD AUTO: 3.98 MIL/UL (ref 4.2–5.4)
SODIUM BLD-SCNC: 142 MEQ/L (ref 135–145)
TOTAL PROTEIN: 7.1 GRAM/DL (ref 6.3–8.2)
TRIGL SERPL-MCNC: 78 MG/DL (ref 30–150)
WBC # BLD AUTO: 4.4 K/UL (ref 4.5–11)

## 2021-11-18 ENCOUNTER — TELEPHONE (OUTPATIENT)
Dept: FAMILY MEDICINE | Facility: CLINIC | Age: 71
End: 2021-11-18
Payer: MEDICARE

## 2022-01-10 ENCOUNTER — PATIENT MESSAGE (OUTPATIENT)
Dept: ADMINISTRATIVE | Facility: HOSPITAL | Age: 72
End: 2022-01-10
Payer: MEDICARE

## 2022-02-07 PROBLEM — Z00.00 ANNUAL PHYSICAL EXAM: Status: RESOLVED | Noted: 2020-11-09 | Resolved: 2022-02-07

## 2022-02-11 ENCOUNTER — PATIENT MESSAGE (OUTPATIENT)
Dept: INTERNAL MEDICINE | Facility: CLINIC | Age: 72
End: 2022-02-11
Payer: MEDICARE

## 2022-02-11 RX ORDER — AMLODIPINE BESYLATE 5 MG/1
5 TABLET ORAL DAILY
Qty: 90 TABLET | Refills: 3 | Status: SHIPPED | OUTPATIENT
Start: 2022-02-11 | End: 2022-11-02

## 2022-02-11 NOTE — TELEPHONE ENCOUNTER
No new care gaps identified.  Powered by [employer]. Reference number: [id number].   2/11/2022 8:39:23 AM CST

## 2022-02-24 DIAGNOSIS — Z71.9 HEALTH EDUCATION/COUNSELING: Primary | ICD-10-CM

## 2022-05-02 ENCOUNTER — OFFICE VISIT (OUTPATIENT)
Dept: INTERNAL MEDICINE | Facility: CLINIC | Age: 72
End: 2022-05-02
Payer: MEDICARE

## 2022-05-02 VITALS
DIASTOLIC BLOOD PRESSURE: 84 MMHG | HEIGHT: 60 IN | RESPIRATION RATE: 18 BRPM | HEART RATE: 72 BPM | SYSTOLIC BLOOD PRESSURE: 138 MMHG | BODY MASS INDEX: 30.81 KG/M2 | WEIGHT: 156.94 LBS | OXYGEN SATURATION: 96 % | TEMPERATURE: 98 F

## 2022-05-02 DIAGNOSIS — Z12.31 SCREENING MAMMOGRAM, ENCOUNTER FOR: Primary | ICD-10-CM

## 2022-05-02 DIAGNOSIS — F33.0 DEPRESSION, MAJOR, RECURRENT, MILD: ICD-10-CM

## 2022-05-02 DIAGNOSIS — E78.5 BORDERLINE HYPERLIPIDEMIA: ICD-10-CM

## 2022-05-02 DIAGNOSIS — I12.9 HYPERTENSION, RENAL DISEASE, STAGE 1-4 OR UNSPECIFIED CHRONIC KIDNEY DISEASE: ICD-10-CM

## 2022-05-02 PROCEDURE — 1101F PT FALLS ASSESS-DOCD LE1/YR: CPT | Mod: CPTII,S$GLB,, | Performed by: INTERNAL MEDICINE

## 2022-05-02 PROCEDURE — 99499 UNLISTED E&M SERVICE: CPT | Mod: S$GLB,,, | Performed by: INTERNAL MEDICINE

## 2022-05-02 PROCEDURE — 3288F PR FALLS RISK ASSESSMENT DOCUMENTED: ICD-10-PCS | Mod: CPTII,S$GLB,, | Performed by: INTERNAL MEDICINE

## 2022-05-02 PROCEDURE — 99214 PR OFFICE/OUTPT VISIT, EST, LEVL IV, 30-39 MIN: ICD-10-PCS | Mod: S$GLB,,, | Performed by: INTERNAL MEDICINE

## 2022-05-02 PROCEDURE — 1126F PR PAIN SEVERITY QUANTIFIED, NO PAIN PRESENT: ICD-10-PCS | Mod: CPTII,S$GLB,, | Performed by: INTERNAL MEDICINE

## 2022-05-02 PROCEDURE — 3008F PR BODY MASS INDEX (BMI) DOCUMENTED: ICD-10-PCS | Mod: CPTII,S$GLB,, | Performed by: INTERNAL MEDICINE

## 2022-05-02 PROCEDURE — 3079F PR MOST RECENT DIASTOLIC BLOOD PRESSURE 80-89 MM HG: ICD-10-PCS | Mod: CPTII,S$GLB,, | Performed by: INTERNAL MEDICINE

## 2022-05-02 PROCEDURE — 4010F PR ACE/ARB THEARPY RXD/TAKEN: ICD-10-PCS | Mod: CPTII,S$GLB,, | Performed by: INTERNAL MEDICINE

## 2022-05-02 PROCEDURE — 99999 PR PBB SHADOW E&M-EST. PATIENT-LVL IV: CPT | Mod: PBBFAC,,, | Performed by: INTERNAL MEDICINE

## 2022-05-02 PROCEDURE — 4010F ACE/ARB THERAPY RXD/TAKEN: CPT | Mod: CPTII,S$GLB,, | Performed by: INTERNAL MEDICINE

## 2022-05-02 PROCEDURE — 99499 RISK ADDL DX/OHS AUDIT: ICD-10-PCS | Mod: S$GLB,,, | Performed by: INTERNAL MEDICINE

## 2022-05-02 PROCEDURE — 1159F PR MEDICATION LIST DOCUMENTED IN MEDICAL RECORD: ICD-10-PCS | Mod: CPTII,S$GLB,, | Performed by: INTERNAL MEDICINE

## 2022-05-02 PROCEDURE — 1160F PR REVIEW ALL MEDS BY PRESCRIBER/CLIN PHARMACIST DOCUMENTED: ICD-10-PCS | Mod: CPTII,S$GLB,, | Performed by: INTERNAL MEDICINE

## 2022-05-02 PROCEDURE — 3079F DIAST BP 80-89 MM HG: CPT | Mod: CPTII,S$GLB,, | Performed by: INTERNAL MEDICINE

## 2022-05-02 PROCEDURE — 99999 PR PBB SHADOW E&M-EST. PATIENT-LVL IV: ICD-10-PCS | Mod: PBBFAC,,, | Performed by: INTERNAL MEDICINE

## 2022-05-02 PROCEDURE — 3288F FALL RISK ASSESSMENT DOCD: CPT | Mod: CPTII,S$GLB,, | Performed by: INTERNAL MEDICINE

## 2022-05-02 PROCEDURE — 1159F MED LIST DOCD IN RCRD: CPT | Mod: CPTII,S$GLB,, | Performed by: INTERNAL MEDICINE

## 2022-05-02 PROCEDURE — 3075F SYST BP GE 130 - 139MM HG: CPT | Mod: CPTII,S$GLB,, | Performed by: INTERNAL MEDICINE

## 2022-05-02 PROCEDURE — 3075F PR MOST RECENT SYSTOLIC BLOOD PRESS GE 130-139MM HG: ICD-10-PCS | Mod: CPTII,S$GLB,, | Performed by: INTERNAL MEDICINE

## 2022-05-02 PROCEDURE — 1160F RVW MEDS BY RX/DR IN RCRD: CPT | Mod: CPTII,S$GLB,, | Performed by: INTERNAL MEDICINE

## 2022-05-02 PROCEDURE — 3008F BODY MASS INDEX DOCD: CPT | Mod: CPTII,S$GLB,, | Performed by: INTERNAL MEDICINE

## 2022-05-02 PROCEDURE — 1126F AMNT PAIN NOTED NONE PRSNT: CPT | Mod: CPTII,S$GLB,, | Performed by: INTERNAL MEDICINE

## 2022-05-02 PROCEDURE — 99214 OFFICE O/P EST MOD 30 MIN: CPT | Mod: S$GLB,,, | Performed by: INTERNAL MEDICINE

## 2022-05-02 PROCEDURE — 1101F PR PT FALLS ASSESS DOC 0-1 FALLS W/OUT INJ PAST YR: ICD-10-PCS | Mod: CPTII,S$GLB,, | Performed by: INTERNAL MEDICINE

## 2022-05-02 NOTE — PROGRESS NOTES
Patient, Merry Terrazas (MRN #[id number]), presented with a recent Estimated Glumerular Filtration Rate (EGFR) between 30 and 45 consistent with the definition of chronic kidney disease stage 3 - moderate (ICD10 - N18.3).    eGFR if non    Date Value Ref Range Status   03/02/2022 38.1 (A) >60 mL/min/1.73 m^2 Final     Comment:     Calculation used to obtain the estimated glomerular filtration  rate (eGFR) is the CKD-EPI equation.          The patient's chronic kidney disease stage 3 was monitored, evaluated, addressed and/or treated. This addendum to the medical record is made on 05/02/2022.

## 2022-05-02 NOTE — PROGRESS NOTES
[facility] Note    Chief Complaint      Chief Complaint   Patient presents with    Follow-up     6m        History of Present Illness      Merry Terrazas is a 72 y.o. female who presents today for   Chief Complaint   Patient presents with    Follow-up     6m    .  Patient comes to appointment here for 6m checkup for chronic issues as below . She is feeling great needs mammogram reordered . just had full labs done in march all reviewed after episode of angioedema in [place] . Is now off lisinopril and is on losartan instead . bp well controlled .     Problem List Items Addressed This Visit        Psychiatric    Depression, major, recurrent, mild    Overview     celexa working well back on 20 mg and is doing well               Cardiac/Vascular    Borderline hyperlipidemia    Overview     Cont current regimen currently diet only              Renal/    Screening mammogram, encounter for - Primary    Overview     Ordered            Hypertension, renal disease, stage 1-4 or unspecified chronic kidney disease    Overview     Seeing renal . Had episode of angioedema with lisinopril is now on losartan hctz 100/12.5                   Past Medical History:  Past Medical History:   Diagnosis Date    Anxiety     GERD (gastroesophageal reflux disease)     Glaucoma     Hypertension        Past Surgical History:  Past Surgical History:   Procedure Laterality Date    BOWEL RESECTION      polyp benign     SECTION      COLON SURGERY      colonoscopy  2021    OPEN REDUCTION AND INTERNAL FIXATION (ORIF) OF INJURY OF ELBOW Left 2021    Procedure: ORIF, ELBOW;  Surgeon: Claude S. Williams IV, MD;  Location: [facility];  Service: Orthopedics;  Laterality: Left;       Family History:  family history includes Hypertension in her maternal grandmother and mother; Stroke in her maternal grandmother and mother.    Social History:  Social History     Socioeconomic History    Marital status:     Tobacco Use    Smoking status: Never Smoker    Smokeless tobacco: Never Used   Substance and Sexual Activity    Alcohol use: Yes     Alcohol/week: 6.0 standard drinks     Types: 6 Glasses of wine per week     Comment: wine most days    Drug use: Never    Sexual activity: Yes     Partners: Male     Birth control/protection: Post-menopausal       Review of Systems:   Review of Systems   HENT: Negative for hearing loss.    Eyes: Negative for discharge.   Respiratory: Negative for wheezing.    Cardiovascular: Negative for chest pain and palpitations.   Gastrointestinal: Negative for blood in stool, constipation, diarrhea and vomiting.   Genitourinary: Negative for dysuria and hematuria.   Musculoskeletal: Negative for neck pain.   Neurological: Negative for weakness and headaches.   Endo/Heme/Allergies: Negative for polydipsia.         Medications:  Outpatient Encounter Medications as of 5/2/2022   Medication Sig Dispense Refill    amLODIPine (NORVASC) 5 MG tablet Take 1 tablet (5 mg total) by mouth once daily. 90 tablet 3    citalopram (CELEXA) 20 MG tablet Take 1.5 tablets (30 mg total) by mouth once daily. 90 tablet 3    Covid-19 vaccine, AD25.COV2.S (AYANA, COVID-19,) 0.5 mL injection Inject into the muscle. 0.5 mL 0    esomeprazole (NEXIUM) 20 MG capsule Take 20 mg by mouth as needed.       latanoprost 0.005 % ophthalmic solution   0    vit C/vit E acet/lutein/min (OCUVITE LUTEIN ORAL) Take by mouth.      vitamin D (VITAMIN D3) 1000 units Tab Take 1,000 Units by mouth once daily.      [DISCONTINUED] lisinopriL-hydrochlorothiazide (PRINZIDE,ZESTORETIC) 20-12.5 mg per tablet TAKE ONE BY MOUTH DAILY 90 tablet 3     No facility-administered encounter medications on file as of 5/2/2022.        Allergies:  Review of patient's allergies indicates:   Allergen Reactions    Ace inhibitors Swelling         Physical Exam         Vitals:    05/02/22 1049   BP: 138/84   Pulse: 72   Resp: 18   Temp: 98  °F (36.7 °C)         Physical Exam  Constitutional:       Appearance: She is well-developed.   Eyes:      Pupils: Pupils are equal, round, and reactive to light.   Neck:      Thyroid: No thyromegaly.   Cardiovascular:      Rate and Rhythm: Normal rate.      Heart sounds: Normal heart sounds. No murmur heard.    No friction rub. No gallop.   Pulmonary:      Breath sounds: Normal breath sounds.   Abdominal:      General: Bowel sounds are normal.      Palpations: Abdomen is soft.   Musculoskeletal:         General: Normal range of motion.      Cervical back: Normal range of motion.   Lymphadenopathy:      Cervical: No cervical adenopathy.   Skin:     General: Skin is warm.      Findings: No rash.   Neurological:      Mental Status: She is alert and oriented to person, place, and time.      Cranial Nerves: No cranial nerve deficit.   Psychiatric:         Behavior: Behavior normal.          Laboratory:  CBC:  Recent Labs   Lab Result Units 03/02/22  0945   WBC K/uL 5.64   RBC M/uL 3.91*   Hemoglobin g/dL 12.6   Hematocrit % 40.2   Platelets K/uL 314   MCV fL 103*   MCH pg 32.2*   MCHC g/dL 31.3*     CMP:  Recent Labs   Lab Result Units 03/02/22  0945   Glucose mg/dL 96   Calcium mg/dL 9.7   Albumin g/dL 3.3*   Total Protein g/dL 7.5   Sodium mmol/L 142   Potassium mmol/L 5.0   CO2 mmol/L 24   Chloride mmol/L 109   BUN mg/dL 31*   Alkaline Phosphatase U/L 51*   ALT U/L 15   AST U/L 18   Total Bilirubin mg/dL 0.3     URINALYSIS:  No results for input(s): COLORU, CLARITYU, SPECGRAV, PHUR, PROTEINUA, GLUCOSEU, BILIRUBINCON, BLOODU, WBCU, RBCU, BACTERIA, MUCUS, NITRITE, LEUKOCYTESUR, UROBILINOGEN, HYALINECASTS in the last 2160 hours.   LIPIDS:  Recent Labs   Lab Result Units 03/02/22  0945   HDL mg/dL 79*   Cholesterol mg/dL 207*   Triglycerides mg/dL 86   LDL Cholesterol mg/dL 110.8   HDL/Cholesterol Ratio % 38.2   Non-HDL Cholesterol mg/dL 128   Total Cholesterol/HDL Ratio  2.6     TSH:  No results for input(s): TSH in the  last 2160 hours.  A1C:  No results for input(s): HGBA1C in the last 2160 hours.    Radiology:        Assessment:     Merry Terrazas is a 72 y.o.female with:    Screening mammogram, encounter for  -     Mammo Digital Screening Bilat w/ Kranthi; Future; Expected date: 05/02/2022    Hypertension, renal disease, stage 1-4 or unspecified chronic kidney disease    Depression, major, recurrent, mild    Borderline hyperlipidemia          Plan:     Problem List Items Addressed This Visit        Psychiatric    Depression, major, recurrent, mild    Overview     celexa working well back on 20 mg and is doing well               Cardiac/Vascular    Borderline hyperlipidemia    Overview     Cont current regimen currently diet only              Renal/    Screening mammogram, encounter for - Primary    Overview     Ordered            Hypertension, renal disease, stage 1-4 or unspecified chronic kidney disease    Overview     Seeing renal . Had episode of angioedema with lisinopril is now on losartan hctz 100/12.5                 As above, continue current medications and maintain follow up with specialists.  Return to clinic in 6 months.      Frederick W Dantagnan [facility] [place]                  Answers for HPI/ROS submitted by the patient on 5/2/2022  activity change: No  unexpected weight change: No  rhinorrhea: No  trouble swallowing: No  visual disturbance: No  chest tightness: No  polyuria: No  difficulty urinating: No  menstrual problem: No  joint swelling: No  arthralgias: No  confusion: No  dysphoric mood: No

## 2022-05-06 ENCOUNTER — PATIENT MESSAGE (OUTPATIENT)
Dept: INTERNAL MEDICINE | Facility: CLINIC | Age: 72
End: 2022-05-06
Payer: MEDICARE

## 2022-05-09 RX ORDER — LOSARTAN POTASSIUM AND HYDROCHLOROTHIAZIDE 25; 100 MG/1; MG/1
1 TABLET ORAL DAILY
Qty: 90 TABLET | Refills: 3 | Status: SHIPPED | OUTPATIENT
Start: 2022-05-09 | End: 2023-02-14

## 2022-05-09 NOTE — TELEPHONE ENCOUNTER
No new care gaps identified.  [facility] Embedded Care Gaps. Reference number: [id number]. 5/08/2022   7:45:07 PM CDT

## 2022-05-12 ENCOUNTER — PATIENT MESSAGE (OUTPATIENT)
Dept: INTERNAL MEDICINE | Facility: CLINIC | Age: 72
End: 2022-05-12
Payer: MEDICARE

## 2022-05-12 DIAGNOSIS — R92.8 ABNORMAL MAMMOGRAM: ICD-10-CM

## 2022-05-12 DIAGNOSIS — Z12.31 SCREENING MAMMOGRAM, ENCOUNTER FOR: Primary | ICD-10-CM

## 2022-05-30 ENCOUNTER — PATIENT MESSAGE (OUTPATIENT)
Dept: ADMINISTRATIVE | Facility: HOSPITAL | Age: 72
End: 2022-05-30
Payer: MEDICARE

## 2022-06-30 ENCOUNTER — PATIENT MESSAGE (OUTPATIENT)
Dept: INTERNAL MEDICINE | Facility: CLINIC | Age: 72
End: 2022-06-30
Payer: MEDICARE

## 2022-07-07 ENCOUNTER — OFFICE VISIT (OUTPATIENT)
Dept: INTERNAL MEDICINE | Facility: CLINIC | Age: 72
End: 2022-07-07
Payer: MEDICARE

## 2022-07-07 VITALS
HEIGHT: 60 IN | RESPIRATION RATE: 18 BRPM | OXYGEN SATURATION: 96 % | BODY MASS INDEX: 30.04 KG/M2 | TEMPERATURE: 98 F | DIASTOLIC BLOOD PRESSURE: 80 MMHG | SYSTOLIC BLOOD PRESSURE: 140 MMHG | HEART RATE: 68 BPM | WEIGHT: 153 LBS

## 2022-07-07 DIAGNOSIS — Z01.818 PREOP EXAM FOR INTERNAL MEDICINE: ICD-10-CM

## 2022-07-07 DIAGNOSIS — H25.9 AGE-RELATED CATARACT OF BOTH EYES, UNSPECIFIED AGE-RELATED CATARACT TYPE: Primary | ICD-10-CM

## 2022-07-07 PROCEDURE — 1159F PR MEDICATION LIST DOCUMENTED IN MEDICAL RECORD: ICD-10-PCS | Mod: CPTII,S$GLB,, | Performed by: INTERNAL MEDICINE

## 2022-07-07 PROCEDURE — 3077F SYST BP >= 140 MM HG: CPT | Mod: CPTII,S$GLB,, | Performed by: INTERNAL MEDICINE

## 2022-07-07 PROCEDURE — 3288F PR FALLS RISK ASSESSMENT DOCUMENTED: ICD-10-PCS | Mod: CPTII,S$GLB,, | Performed by: INTERNAL MEDICINE

## 2022-07-07 PROCEDURE — 3079F DIAST BP 80-89 MM HG: CPT | Mod: CPTII,S$GLB,, | Performed by: INTERNAL MEDICINE

## 2022-07-07 PROCEDURE — 99214 OFFICE O/P EST MOD 30 MIN: CPT | Mod: S$GLB,,, | Performed by: INTERNAL MEDICINE

## 2022-07-07 PROCEDURE — 4010F ACE/ARB THERAPY RXD/TAKEN: CPT | Mod: CPTII,S$GLB,, | Performed by: INTERNAL MEDICINE

## 2022-07-07 PROCEDURE — 3288F FALL RISK ASSESSMENT DOCD: CPT | Mod: CPTII,S$GLB,, | Performed by: INTERNAL MEDICINE

## 2022-07-07 PROCEDURE — 3077F PR MOST RECENT SYSTOLIC BLOOD PRESSURE >= 140 MM HG: ICD-10-PCS | Mod: CPTII,S$GLB,, | Performed by: INTERNAL MEDICINE

## 2022-07-07 PROCEDURE — 3079F PR MOST RECENT DIASTOLIC BLOOD PRESSURE 80-89 MM HG: ICD-10-PCS | Mod: CPTII,S$GLB,, | Performed by: INTERNAL MEDICINE

## 2022-07-07 PROCEDURE — 1160F RVW MEDS BY RX/DR IN RCRD: CPT | Mod: CPTII,S$GLB,, | Performed by: INTERNAL MEDICINE

## 2022-07-07 PROCEDURE — 99214 PR OFFICE/OUTPT VISIT, EST, LEVL IV, 30-39 MIN: ICD-10-PCS | Mod: S$GLB,,, | Performed by: INTERNAL MEDICINE

## 2022-07-07 PROCEDURE — 1101F PT FALLS ASSESS-DOCD LE1/YR: CPT | Mod: CPTII,S$GLB,, | Performed by: INTERNAL MEDICINE

## 2022-07-07 PROCEDURE — 1126F PR PAIN SEVERITY QUANTIFIED, NO PAIN PRESENT: ICD-10-PCS | Mod: CPTII,S$GLB,, | Performed by: INTERNAL MEDICINE

## 2022-07-07 PROCEDURE — 99999 PR PBB SHADOW E&M-EST. PATIENT-LVL IV: CPT | Mod: PBBFAC,,, | Performed by: INTERNAL MEDICINE

## 2022-07-07 PROCEDURE — 1101F PR PT FALLS ASSESS DOC 0-1 FALLS W/OUT INJ PAST YR: ICD-10-PCS | Mod: CPTII,S$GLB,, | Performed by: INTERNAL MEDICINE

## 2022-07-07 PROCEDURE — 1159F MED LIST DOCD IN RCRD: CPT | Mod: CPTII,S$GLB,, | Performed by: INTERNAL MEDICINE

## 2022-07-07 PROCEDURE — 99999 PR PBB SHADOW E&M-EST. PATIENT-LVL IV: ICD-10-PCS | Mod: PBBFAC,,, | Performed by: INTERNAL MEDICINE

## 2022-07-07 PROCEDURE — 1160F PR REVIEW ALL MEDS BY PRESCRIBER/CLIN PHARMACIST DOCUMENTED: ICD-10-PCS | Mod: CPTII,S$GLB,, | Performed by: INTERNAL MEDICINE

## 2022-07-07 PROCEDURE — 4010F PR ACE/ARB THEARPY RXD/TAKEN: ICD-10-PCS | Mod: CPTII,S$GLB,, | Performed by: INTERNAL MEDICINE

## 2022-07-07 PROCEDURE — 3008F BODY MASS INDEX DOCD: CPT | Mod: CPTII,S$GLB,, | Performed by: INTERNAL MEDICINE

## 2022-07-07 PROCEDURE — 1126F AMNT PAIN NOTED NONE PRSNT: CPT | Mod: CPTII,S$GLB,, | Performed by: INTERNAL MEDICINE

## 2022-07-07 PROCEDURE — 3008F PR BODY MASS INDEX (BMI) DOCUMENTED: ICD-10-PCS | Mod: CPTII,S$GLB,, | Performed by: INTERNAL MEDICINE

## 2022-07-07 NOTE — PROGRESS NOTES
[facility] Note    Chief Complaint      Chief Complaint   Patient presents with    Pre-op Exam       History of Present Illness      Merry Terrazas is a 72 y.o. female who presents today for   Chief Complaint   Patient presents with    Pre-op Exam   .  Patient comes to appointment here for prep eval for left cataract removal . She will be seeing dr mathieu mccann . She has good functional status can go up flight of steps with no cehs tpain or mehta. She had the right cataract removal procedure in 2019 . Preprinted clearance form has been completed and scanned in     Problem List Items Addressed This Visit        Ophtho    Age-related cataract of both eyes - Primary    Overview     Removal with optho  preop assessment done               Other    Preop exam for internal medicine    Overview     See preprinted  clearence form . Cleared for procedure with low risk cardiac complications                    Past Medical History:  Past Medical History:   Diagnosis Date    Anxiety     GERD (gastroesophageal reflux disease)     Glaucoma     Hypertension        Past Surgical History:  Past Surgical History:   Procedure Laterality Date    BOWEL RESECTION      polyp benign     SECTION      COLON SURGERY      colonoscopy  2021    OPEN REDUCTION AND INTERNAL FIXATION (ORIF) OF INJURY OF ELBOW Left 2021    Procedure: ORIF, ELBOW;  Surgeon: Claude S. Williams IV, MD;  Location: [facility];  Service: Orthopedics;  Laterality: Left;       Family History:  family history includes Hypertension in her maternal grandmother and mother; Stroke in her maternal grandmother and mother.    Social History:  Social History     Socioeconomic History    Marital status:    Tobacco Use    Smoking status: Never Smoker    Smokeless tobacco: Never Used   Substance and Sexual Activity    Alcohol use: Yes     Alcohol/week: 6.0 standard drinks     Types: 6 Glasses of wine per week      Comment: wine most days    Drug use: Never    Sexual activity: Yes     Partners: Male     Birth control/protection: Post-menopausal       Review of Systems:   Review of Systems   Constitutional: Negative for fever and weight loss.   HENT: Negative for congestion, hearing loss and sore throat.    Eyes: Positive for blurred vision.   Respiratory: Negative for cough and shortness of breath.    Cardiovascular: Negative for chest pain, palpitations, claudication and leg swelling.   Gastrointestinal: Negative for abdominal pain, constipation, diarrhea, heartburn, nausea and vomiting.   Genitourinary: Negative for dysuria.   Musculoskeletal: Negative for back pain and myalgias.   Skin: Negative for rash.   Neurological: Negative for focal weakness and headaches.   Psychiatric/Behavioral: Negative for depression, memory loss and suicidal ideas. The patient is not nervous/anxious.          Medications:  Outpatient Encounter Medications as of 7/7/2022   Medication Sig Dispense Refill    amLODIPine (NORVASC) 5 MG tablet Take 1 tablet (5 mg total) by mouth once daily. 90 tablet 3    citalopram (CELEXA) 20 MG tablet Take 1.5 tablets (30 mg total) by mouth once daily. 90 tablet 3    esomeprazole (NEXIUM) 20 MG capsule Take 20 mg by mouth as needed.       latanoprost 0.005 % ophthalmic solution   0    losartan-hydrochlorothiazide 100-25 mg (HYZAAR) 100-25 mg per tablet Take 1 tablet by mouth once daily. 90 tablet 3    vit C/vit E acet/lutein/min (OCUVITE LUTEIN ORAL) Take by mouth.      vitamin D (VITAMIN D3) 1000 units Tab Take 1,000 Units by mouth once daily.      Covid-19 vaccine, AD25.COV2.S (AYANA, COVID-19,) 0.5 mL injection Inject into the muscle. (Patient not taking: Reported on 7/7/2022) 0.5 mL 0     No facility-administered encounter medications on file as of 7/7/2022.        Allergies:  Review of patient's allergies indicates:   Allergen Reactions    Ace inhibitors Swelling         Physical Exam          Vitals:    07/07/22 0945   BP: (!) 140/80   Pulse: 68   Resp: 18   Temp: 97.6 °F (36.4 °C)         Physical Exam  Constitutional:       Appearance: She is well-developed.   Eyes:      Pupils: Pupils are equal, round, and reactive to light.   Neck:      Thyroid: No thyromegaly.   Cardiovascular:      Rate and Rhythm: Normal rate.      Heart sounds: Normal heart sounds. No murmur heard.    No friction rub. No gallop.   Pulmonary:      Breath sounds: Normal breath sounds.   Abdominal:      General: Bowel sounds are normal.      Palpations: Abdomen is soft.   Musculoskeletal:         General: Normal range of motion.      Cervical back: Normal range of motion.   Lymphadenopathy:      Cervical: No cervical adenopathy.   Skin:     General: Skin is warm.      Findings: No rash.   Neurological:      Mental Status: She is alert and oriented to person, place, and time.      Cranial Nerves: No cranial nerve deficit.   Psychiatric:         Behavior: Behavior normal.          Laboratory:  CBC:  No results for input(s): WBC, RBC, HGB, HCT, PLT, MCV, MCH, MCHC in the last 2160 hours.  CMP:  No results for input(s): GLU, CALCIUM, ALBUMIN, PROT, NA, K, CO2, CL, BUN, ALKPHOS, ALT, AST, BILITOT in the last 2160 hours.    Invalid input(s): CREATININ  URINALYSIS:  No results for input(s): COLORU, CLARITYU, SPECGRAV, PHUR, PROTEINUA, GLUCOSEU, BILIRUBINCON, BLOODU, WBCU, RBCU, BACTERIA, MUCUS, NITRITE, LEUKOCYTESUR, UROBILINOGEN, HYALINECASTS in the last 2160 hours.   LIPIDS:  No results for input(s): TSH, HDL, CHOL, TRIG, LDLCALC, CHOLHDL, NONHDLCHOL, TOTALCHOLEST in the last 2160 hours.  TSH:  No results for input(s): TSH in the last 2160 hours.  A1C:  No results for input(s): HGBA1C in the last 2160 hours.    Radiology:        Assessment:     Merry Terrazas is a 72 y.o.female with:    Age-related cataract of both eyes, unspecified age-related cataract type    Preop exam for internal medicine          Plan:     Problem List Items  Addressed This Visit        Ophtho    Age-related cataract of both eyes - Primary    Overview     Removal with optho  preop assessment done               Other    Preop exam for internal medicine    Overview     See preprinted  clearence form . Cleared for procedure with low risk cardiac complications                  As above, continue current medications and maintain follow up with specialists.  Return to clinic as schediled .      Frederick W Dantagnan [facility] [place]

## 2022-11-07 ENCOUNTER — OFFICE VISIT (OUTPATIENT)
Dept: INTERNAL MEDICINE | Facility: CLINIC | Age: 72
End: 2022-11-07
Payer: MEDICARE

## 2022-11-07 VITALS
RESPIRATION RATE: 18 BRPM | HEART RATE: 80 BPM | TEMPERATURE: 98 F | SYSTOLIC BLOOD PRESSURE: 128 MMHG | WEIGHT: 154.56 LBS | HEIGHT: 60 IN | DIASTOLIC BLOOD PRESSURE: 74 MMHG | OXYGEN SATURATION: 96 % | BODY MASS INDEX: 30.35 KG/M2

## 2022-11-07 DIAGNOSIS — M25.511 ACUTE PAIN OF RIGHT SHOULDER: ICD-10-CM

## 2022-11-07 DIAGNOSIS — E78.5 BORDERLINE HYPERLIPIDEMIA: ICD-10-CM

## 2022-11-07 DIAGNOSIS — F33.0 DEPRESSION, MAJOR, RECURRENT, MILD: ICD-10-CM

## 2022-11-07 DIAGNOSIS — I12.9 HYPERTENSION, RENAL DISEASE, STAGE 1-4 OR UNSPECIFIED CHRONIC KIDNEY DISEASE: Primary | ICD-10-CM

## 2022-11-07 PROCEDURE — 1101F PT FALLS ASSESS-DOCD LE1/YR: CPT | Mod: CPTII,S$GLB,, | Performed by: INTERNAL MEDICINE

## 2022-11-07 PROCEDURE — 99214 OFFICE O/P EST MOD 30 MIN: CPT | Mod: S$GLB,,, | Performed by: INTERNAL MEDICINE

## 2022-11-07 PROCEDURE — 3008F PR BODY MASS INDEX (BMI) DOCUMENTED: ICD-10-PCS | Mod: CPTII,S$GLB,, | Performed by: INTERNAL MEDICINE

## 2022-11-07 PROCEDURE — 99999 PR PBB SHADOW E&M-EST. PATIENT-LVL IV: ICD-10-PCS | Mod: PBBFAC,,, | Performed by: INTERNAL MEDICINE

## 2022-11-07 PROCEDURE — 4010F PR ACE/ARB THEARPY RXD/TAKEN: ICD-10-PCS | Mod: CPTII,S$GLB,, | Performed by: INTERNAL MEDICINE

## 2022-11-07 PROCEDURE — 3074F PR MOST RECENT SYSTOLIC BLOOD PRESSURE < 130 MM HG: ICD-10-PCS | Mod: CPTII,S$GLB,, | Performed by: INTERNAL MEDICINE

## 2022-11-07 PROCEDURE — 99999 PR PBB SHADOW E&M-EST. PATIENT-LVL IV: CPT | Mod: PBBFAC,,, | Performed by: INTERNAL MEDICINE

## 2022-11-07 PROCEDURE — 1126F PR PAIN SEVERITY QUANTIFIED, NO PAIN PRESENT: ICD-10-PCS | Mod: CPTII,S$GLB,, | Performed by: INTERNAL MEDICINE

## 2022-11-07 PROCEDURE — 1101F PR PT FALLS ASSESS DOC 0-1 FALLS W/OUT INJ PAST YR: ICD-10-PCS | Mod: CPTII,S$GLB,, | Performed by: INTERNAL MEDICINE

## 2022-11-07 PROCEDURE — 1160F RVW MEDS BY RX/DR IN RCRD: CPT | Mod: CPTII,S$GLB,, | Performed by: INTERNAL MEDICINE

## 2022-11-07 PROCEDURE — 3008F BODY MASS INDEX DOCD: CPT | Mod: CPTII,S$GLB,, | Performed by: INTERNAL MEDICINE

## 2022-11-07 PROCEDURE — 1159F MED LIST DOCD IN RCRD: CPT | Mod: CPTII,S$GLB,, | Performed by: INTERNAL MEDICINE

## 2022-11-07 PROCEDURE — 3078F DIAST BP <80 MM HG: CPT | Mod: CPTII,S$GLB,, | Performed by: INTERNAL MEDICINE

## 2022-11-07 PROCEDURE — 3288F FALL RISK ASSESSMENT DOCD: CPT | Mod: CPTII,S$GLB,, | Performed by: INTERNAL MEDICINE

## 2022-11-07 PROCEDURE — 3078F PR MOST RECENT DIASTOLIC BLOOD PRESSURE < 80 MM HG: ICD-10-PCS | Mod: CPTII,S$GLB,, | Performed by: INTERNAL MEDICINE

## 2022-11-07 PROCEDURE — 3288F PR FALLS RISK ASSESSMENT DOCUMENTED: ICD-10-PCS | Mod: CPTII,S$GLB,, | Performed by: INTERNAL MEDICINE

## 2022-11-07 PROCEDURE — 4010F ACE/ARB THERAPY RXD/TAKEN: CPT | Mod: CPTII,S$GLB,, | Performed by: INTERNAL MEDICINE

## 2022-11-07 PROCEDURE — 1160F PR REVIEW ALL MEDS BY PRESCRIBER/CLIN PHARMACIST DOCUMENTED: ICD-10-PCS | Mod: CPTII,S$GLB,, | Performed by: INTERNAL MEDICINE

## 2022-11-07 PROCEDURE — 3074F SYST BP LT 130 MM HG: CPT | Mod: CPTII,S$GLB,, | Performed by: INTERNAL MEDICINE

## 2022-11-07 PROCEDURE — 1159F PR MEDICATION LIST DOCUMENTED IN MEDICAL RECORD: ICD-10-PCS | Mod: CPTII,S$GLB,, | Performed by: INTERNAL MEDICINE

## 2022-11-07 PROCEDURE — 1126F AMNT PAIN NOTED NONE PRSNT: CPT | Mod: CPTII,S$GLB,, | Performed by: INTERNAL MEDICINE

## 2022-11-07 PROCEDURE — 99214 PR OFFICE/OUTPT VISIT, EST, LEVL IV, 30-39 MIN: ICD-10-PCS | Mod: S$GLB,,, | Performed by: INTERNAL MEDICINE

## 2022-11-07 NOTE — PROGRESS NOTES
[facility] Note    Chief Complaint      Chief Complaint   Patient presents with    Follow-up     6m        History of Present Illness      Merry Terrazas is a 72 y.o. female who presents today for   Chief Complaint   Patient presents with    Follow-up     6m    .  Patient comes to appointment here for 6m checkup for chronic issues as below . She is feeling well is under some stress regarding her husbands health but is still doing well . Labs due next visit . She has received covid vaccine . She states last month  while visiting friend had fall on to right side and has been having some shoulder pain and decreased range of motion .     Problem List Items Addressed This Visit          Psychiatric    Depression, major, recurrent, mild    Overview     celexa working well back on 20 mg and is doing well             Cardiac/Vascular    Borderline hyperlipidemia    Overview     Cont current regimen currently diet only            Renal/    Hypertension, renal disease, stage 1-4 or unspecified chronic kidney disease - Primary    Overview      losartan hctz 100/12.5 tolerating well and blood pressure well controlled           Other Visit Diagnoses       Acute pain of right shoulder                  Past Medical History:  Past Medical History:   Diagnosis Date    Anxiety     GERD (gastroesophageal reflux disease)     Glaucoma     Hypertension        Past Surgical History:  Past Surgical History:   Procedure Laterality Date    BOWEL RESECTION      polyp benign     SECTION      COLON SURGERY      colonoscopy  2021    OPEN REDUCTION AND INTERNAL FIXATION (ORIF) OF INJURY OF ELBOW Left 2021    Procedure: ORIF, ELBOW;  Surgeon: Claude S. Williams IV, MD;  Location: [facility];  Service: Orthopedics;  Laterality: Left;       Family History:  family history includes Hypertension in her maternal grandmother and mother; Stroke in her maternal grandmother and mother.    Social  History:  Social History     Socioeconomic History    Marital status:    Tobacco Use    Smoking status: Never    Smokeless tobacco: Never   Substance and Sexual Activity    Alcohol use: Yes     Alcohol/week: 6.0 standard drinks     Types: 6 Glasses of wine per week     Comment: wine most days    Drug use: Never    Sexual activity: Yes     Partners: Male     Birth control/protection: Post-menopausal       Review of Systems:   Review of Systems   Constitutional:  Negative for fever and weight loss.   HENT:  Negative for congestion, hearing loss and sore throat.    Eyes:  Negative for blurred vision.   Respiratory:  Negative for cough and shortness of breath.    Cardiovascular:  Negative for chest pain, palpitations, claudication and leg swelling.   Gastrointestinal:  Negative for abdominal pain, constipation, diarrhea and heartburn.   Genitourinary:  Negative for dysuria.   Musculoskeletal:  Positive for joint pain. Negative for back pain and myalgias.   Skin:  Negative for rash.   Neurological:  Negative for focal weakness and headaches.   Psychiatric/Behavioral:  Negative for depression and suicidal ideas. The patient is not nervous/anxious.        Medications:  Outpatient Encounter Medications as of 11/7/2022   Medication Sig Dispense Refill    amLODIPine (NORVASC) 5 MG tablet TAKE 1 TABLET (5 MG TOTAL) BY MOUTH ONCE DAILY. 90 tablet 3    citalopram (CELEXA) 20 MG tablet TAKE ONE AND ONE-HALF BY MOUTH DAILY 135 tablet 3    Covid-19 vaccine, AD25.COV2.S (AYANA, COVID-19,) 0.5 mL injection Inject into the muscle. 0.5 mL 0    esomeprazole (NEXIUM) 20 MG capsule Take 20 mg by mouth as needed.       latanoprost 0.005 % ophthalmic solution   0    losartan-hydrochlorothiazide 100-25 mg (HYZAAR) 100-25 mg per tablet Take 1 tablet by mouth once daily. 90 tablet 3    vit C/vit E acet/lutein/min (OCUVITE LUTEIN ORAL) Take by mouth.      vitamin D (VITAMIN D3) 1000 units Tab Take 1,000 Units by mouth once daily.        No facility-administered encounter medications on file as of 11/7/2022.        Allergies:  Review of patient's allergies indicates:   Allergen Reactions    Ace inhibitors Swelling         Physical Exam         Vitals:    11/07/22 1006   BP: 128/74   Pulse: 80   Resp: 18   Temp: 97.7 °F (36.5 °C)         Physical Exam  Constitutional:       Appearance: She is well-developed.   Eyes:      Pupils: Pupils are equal, round, and reactive to light.   Neck:      Thyroid: No thyromegaly.   Cardiovascular:      Rate and Rhythm: Normal rate.      Heart sounds: Normal heart sounds. No murmur heard.    No friction rub. No gallop.   Pulmonary:      Breath sounds: Normal breath sounds.   Abdominal:      General: Bowel sounds are normal.      Palpations: Abdomen is soft.   Musculoskeletal:      Right shoulder: Decreased range of motion.      Cervical back: Normal range of motion.   Lymphadenopathy:      Cervical: No cervical adenopathy.   Skin:     General: Skin is warm.      Findings: No rash.   Neurological:      Mental Status: She is alert and oriented to person, place, and time.      Cranial Nerves: No cranial nerve deficit.   Psychiatric:         Behavior: Behavior normal.        Laboratory:  CBC:  No results for input(s): WBC, RBC, HGB, HCT, PLT, MCV, MCH, MCHC in the last 2160 hours.  CMP:  No results for input(s): GLU, CALCIUM, ALBUMIN, PROT, NA, K, CO2, CL, BUN, ALKPHOS, ALT, AST, BILITOT in the last 2160 hours.    Invalid input(s): CREATININ  URINALYSIS:  No results for input(s): COLORU, CLARITYU, SPECGRAV, PHUR, PROTEINUA, GLUCOSEU, BILIRUBINCON, BLOODU, WBCU, RBCU, BACTERIA, MUCUS, NITRITE, LEUKOCYTESUR, UROBILINOGEN, HYALINECASTS in the last 2160 hours.   LIPIDS:  No results for input(s): TSH, HDL, CHOL, TRIG, LDLCALC, CHOLHDL, NONHDLCHOL, TOTALCHOLEST in the last 2160 hours.  TSH:  No results for input(s): TSH in the last 2160 hours.  A1C:  No results for input(s): HGBA1C in the last 2160 hours.    Radiology:         Assessment:     Merry Terrazas is a 72 y.o.female with:    Hypertension, renal disease, stage 1-4 or unspecified chronic kidney disease    Depression, major, recurrent, mild    Borderline hyperlipidemia    Acute pain of right shoulder  -     Ambulatory referral/consult to Orthopedics; Future; Expected date: 11/14/2022        Plan:     Problem List Items Addressed This Visit          Psychiatric    Depression, major, recurrent, mild    Overview     celexa working well back on 20 mg and is doing well             Cardiac/Vascular    Borderline hyperlipidemia    Overview     Cont current regimen currently diet only            Renal/    Hypertension, renal disease, stage 1-4 or unspecified chronic kidney disease - Primary    Overview      losartan hctz 100/12.5 tolerating well and blood pressure well controlled           Other Visit Diagnoses       Acute pain of right shoulder                As above, continue current medications and maintain follow up with specialists.  Return to clinic in 6 months.      Frederick W Dantagnan [facility] [place]

## 2022-11-10 ENCOUNTER — HOSPITAL ENCOUNTER (OUTPATIENT)
Dept: RADIOLOGY | Facility: HOSPITAL | Age: 72
Discharge: HOME | End: 2022-11-10
Attending: PHYSICIAN ASSISTANT
Payer: MEDICARE

## 2022-11-10 ENCOUNTER — OFFICE VISIT (OUTPATIENT)
Dept: ORTHOPEDICS | Facility: CLINIC | Age: 72
End: 2022-11-10
Payer: MEDICARE

## 2022-11-10 VITALS
HEIGHT: 60 IN | BODY MASS INDEX: 29.86 KG/M2 | DIASTOLIC BLOOD PRESSURE: 82 MMHG | SYSTOLIC BLOOD PRESSURE: 159 MMHG | HEART RATE: 79 BPM | WEIGHT: 152.13 LBS

## 2022-11-10 DIAGNOSIS — M25.511 ACUTE PAIN OF RIGHT SHOULDER: Primary | ICD-10-CM

## 2022-11-10 DIAGNOSIS — M25.511 ACUTE PAIN OF RIGHT SHOULDER: ICD-10-CM

## 2022-11-10 PROCEDURE — 1126F AMNT PAIN NOTED NONE PRSNT: CPT | Mod: CPTII,S$GLB,, | Performed by: PHYSICIAN ASSISTANT

## 2022-11-10 PROCEDURE — 99203 OFFICE O/P NEW LOW 30 MIN: CPT | Mod: S$GLB,,, | Performed by: PHYSICIAN ASSISTANT

## 2022-11-10 PROCEDURE — 3008F BODY MASS INDEX DOCD: CPT | Mod: CPTII,S$GLB,, | Performed by: PHYSICIAN ASSISTANT

## 2022-11-10 PROCEDURE — 4010F ACE/ARB THERAPY RXD/TAKEN: CPT | Mod: CPTII,S$GLB,, | Performed by: PHYSICIAN ASSISTANT

## 2022-11-10 PROCEDURE — 3077F SYST BP >= 140 MM HG: CPT | Mod: CPTII,S$GLB,, | Performed by: PHYSICIAN ASSISTANT

## 2022-11-10 PROCEDURE — 1160F RVW MEDS BY RX/DR IN RCRD: CPT | Mod: CPTII,S$GLB,, | Performed by: PHYSICIAN ASSISTANT

## 2022-11-10 PROCEDURE — 1101F PR PT FALLS ASSESS DOC 0-1 FALLS W/OUT INJ PAST YR: ICD-10-PCS | Mod: CPTII,S$GLB,, | Performed by: PHYSICIAN ASSISTANT

## 2022-11-10 PROCEDURE — 3288F FALL RISK ASSESSMENT DOCD: CPT | Mod: CPTII,S$GLB,, | Performed by: PHYSICIAN ASSISTANT

## 2022-11-10 PROCEDURE — 1160F PR REVIEW ALL MEDS BY PRESCRIBER/CLIN PHARMACIST DOCUMENTED: ICD-10-PCS | Mod: CPTII,S$GLB,, | Performed by: PHYSICIAN ASSISTANT

## 2022-11-10 PROCEDURE — 99203 PR OFFICE/OUTPT VISIT, NEW, LEVL III, 30-44 MIN: ICD-10-PCS | Mod: S$GLB,,, | Performed by: PHYSICIAN ASSISTANT

## 2022-11-10 PROCEDURE — 1159F MED LIST DOCD IN RCRD: CPT | Mod: CPTII,S$GLB,, | Performed by: PHYSICIAN ASSISTANT

## 2022-11-10 PROCEDURE — 3079F PR MOST RECENT DIASTOLIC BLOOD PRESSURE 80-89 MM HG: ICD-10-PCS | Mod: CPTII,S$GLB,, | Performed by: PHYSICIAN ASSISTANT

## 2022-11-10 PROCEDURE — 99999 PR PBB SHADOW E&M-EST. PATIENT-LVL V: CPT | Mod: PBBFAC,,, | Performed by: PHYSICIAN ASSISTANT

## 2022-11-10 PROCEDURE — 3077F PR MOST RECENT SYSTOLIC BLOOD PRESSURE >= 140 MM HG: ICD-10-PCS | Mod: CPTII,S$GLB,, | Performed by: PHYSICIAN ASSISTANT

## 2022-11-10 PROCEDURE — 3008F PR BODY MASS INDEX (BMI) DOCUMENTED: ICD-10-PCS | Mod: CPTII,S$GLB,, | Performed by: PHYSICIAN ASSISTANT

## 2022-11-10 PROCEDURE — 99999 PR PBB SHADOW E&M-EST. PATIENT-LVL V: ICD-10-PCS | Mod: PBBFAC,,, | Performed by: PHYSICIAN ASSISTANT

## 2022-11-10 PROCEDURE — 1159F PR MEDICATION LIST DOCUMENTED IN MEDICAL RECORD: ICD-10-PCS | Mod: CPTII,S$GLB,, | Performed by: PHYSICIAN ASSISTANT

## 2022-11-10 PROCEDURE — 4010F PR ACE/ARB THEARPY RXD/TAKEN: ICD-10-PCS | Mod: CPTII,S$GLB,, | Performed by: PHYSICIAN ASSISTANT

## 2022-11-10 PROCEDURE — 3288F PR FALLS RISK ASSESSMENT DOCUMENTED: ICD-10-PCS | Mod: CPTII,S$GLB,, | Performed by: PHYSICIAN ASSISTANT

## 2022-11-10 PROCEDURE — 3079F DIAST BP 80-89 MM HG: CPT | Mod: CPTII,S$GLB,, | Performed by: PHYSICIAN ASSISTANT

## 2022-11-10 PROCEDURE — 73030 X-RAY EXAM OF SHOULDER: CPT | Mod: TC,RT

## 2022-11-10 PROCEDURE — 1126F PR PAIN SEVERITY QUANTIFIED, NO PAIN PRESENT: ICD-10-PCS | Mod: CPTII,S$GLB,, | Performed by: PHYSICIAN ASSISTANT

## 2022-11-10 PROCEDURE — 73030 XR SHOULDER COMPLETE 2 OR MORE VIEWS RIGHT: ICD-10-PCS | Mod: 26,RT,, | Performed by: RADIOLOGY

## 2022-11-10 PROCEDURE — 1101F PT FALLS ASSESS-DOCD LE1/YR: CPT | Mod: CPTII,S$GLB,, | Performed by: PHYSICIAN ASSISTANT

## 2022-11-10 PROCEDURE — 73030 X-RAY EXAM OF SHOULDER: CPT | Mod: 26,RT,, | Performed by: RADIOLOGY

## 2022-11-10 NOTE — PROGRESS NOTES
SUBJECTIVE:     Chief Complaint & History of Present Illness:  Merry Terrazas is a  New  patient 72 y.o. female who is seen here today with a complaint of    Chief Complaint   Patient presents with    Right Shoulder - Pain    .  Patient is here today as referral from her primary care for persistent soreness in the right shoulder particularly with overhead activities.  Suffered fall approximately 1 month ago onto that shoulder had significant pain for the 1st week or 2 after the fall but has slowly gotten better.  She is regained almost all of her range of motion but still has some soreness particularly with forward flexion and resisted lifting.  Initially took some over-the-counter NSAIDs with very good results  On a scale of 1-10, with 10 being worst pain imaginable, he rates this pain as 2 on good days and 3 on bad days.  she describes the pain as achy.    Review of patient's allergies indicates:   Allergen Reactions    Ace inhibitors Swelling         Current Outpatient Medications   Medication Sig Dispense Refill    amLODIPine (NORVASC) 5 MG tablet TAKE 1 TABLET (5 MG TOTAL) BY MOUTH ONCE DAILY. 90 tablet 3    citalopram (CELEXA) 20 MG tablet TAKE ONE AND ONE-HALF BY MOUTH DAILY 135 tablet 3    Covid-19 vaccine, AD25.COV2.S (AYANA, COVID-19,) 0.5 mL injection Inject into the muscle. 0.5 mL 0    esomeprazole (NEXIUM) 20 MG capsule Take 20 mg by mouth as needed.       latanoprost 0.005 % ophthalmic solution   0    losartan-hydrochlorothiazide 100-25 mg (HYZAAR) 100-25 mg per tablet Take 1 tablet by mouth once daily. 90 tablet 3    vit C/vit E acet/lutein/min (OCUVITE LUTEIN ORAL) Take by mouth.      vitamin D (VITAMIN D3) 1000 units Tab Take 1,000 Units by mouth once daily.       No current facility-administered medications for this visit.       Past Medical History:   Diagnosis Date    Anxiety     GERD (gastroesophageal reflux disease)     Glaucoma     Hypertension        Past Surgical History:   Procedure  Laterality Date    BOWEL RESECTION      polyp benign     SECTION      COLON SURGERY      colonoscopy  2021    OPEN REDUCTION AND INTERNAL FIXATION (ORIF) OF INJURY OF ELBOW Left 2021    Procedure: ORIF, ELBOW;  Surgeon: Claude S. Williams IV, MD;  Location: [facility];  Service: Orthopedics;  Laterality: Left;       Vital Signs (Most Recent)  Vitals:    11/10/22 1511   BP: (!) 159/82   Pulse: 79       Review of Systems:  ROS:  Constitutional: no fever or chills  Eyes: no visual changes, positive for glaucoma  ENT: no nasal congestion or sore throat  Respiratory: no cough or shortness of breath  Cardiovascular: no chest pain or palpitations  Gastrointestinal: no nausea or vomiting, tolerating diet, positive for GERD  Genitourinary: no hematuria or dysuria  Integument/Breast: no rash or pruritis  Hematologic/Lymphatic: no easy bruising or lymphadenopathy  Musculoskeletal: no arthralgias or myalgias  Neurological: no seizures or tremors  Behavioral/Psych: no auditory or visual hallucinations, positive for anxiety and depression  Endocrine: no heat or cold intolerance      OBJECTIVE:     PHYSICAL EXAM:  Height: 5' (152.4 cm) Weight: 69 kg (152 lb 1.6 oz), General Appearance: Well nourished, well developed, in no acute distress.  Neurological: Mood & affect are normal.  Shoulder exam: right  Tenderness: posterior acromial  ROM: forward flexion 180/180, extension 45/45, full abduction 180/180, abduction-glenohumeral 90/90, external rotation 50/50  Shoulder Strength: biceps 5/5, triceps 5/5, abduction 5/5, adduction 5/5, external rotation 5/5 with shoulder at side, flexion 5/5, and extension 5/5  positive for tenderness about the glenohumeral joint, negative for tenderness over the acromioclavicular joint, and negative for impingement sign  Stability tests: anterior apprehension test positive for pain only and posterior apprehension test negative  Special Tests:Cross-chest abduction: negative and Calvert's  test: negative                     RADIOGRAPHS:  X-rays taken today films reviewed by me demonstrate no evidence of fracture dislocation or about the shoulder mild glenohumeral joint space narrowing and a low riding humerus within the glenohumeral joint    ASSESSMENT/PLAN:       ICD-10-CM ICD-9-CM   1. Acute pain of right shoulder  M25.511 719.41       Plan: We discussed with the patient at length all the different treatment options available for her right shoulder including anti-inflammatories, acetaminophen, rest, ice, Physical therapy to include strengthening exercise, occasional cortisone injections for temporary relief, arthroscopic surgical repair, and finally shoulder arthroplasty.   Physical therapy to include dry needling to decrease inflammation increased range of motion follow-up in 4 weeks if symptoms not significantly improved sooner symptoms dictate

## 2022-12-08 ENCOUNTER — CLINICAL SUPPORT (OUTPATIENT)
Dept: REHABILITATION | Facility: HOSPITAL | Age: 72
End: 2022-12-08
Payer: MEDICARE

## 2022-12-08 DIAGNOSIS — M25.511 ACUTE PAIN OF RIGHT SHOULDER: ICD-10-CM

## 2022-12-08 DIAGNOSIS — M62.81 MUSCLE WEAKNESS OF UPPER EXTREMITY: ICD-10-CM

## 2022-12-08 PROCEDURE — 97161 PT EVAL LOW COMPLEX 20 MIN: CPT | Mod: PO

## 2022-12-08 NOTE — PLAN OF CARE
"  [facility]   Physical Therapy Initial Evaluation     Date: 12/8/2022   Name: Merry Terrazas  Clinic Number: [id number]    Therapy Diagnosis:   Encounter Diagnoses   Name Primary?    Acute pain of right shoulder     Muscle weakness of upper extremity      Physician: Clement Bryant PA*    Physician Orders: PT Eval and Treat   Medical Diagnosis from Referral: Acute pain of right shoulder [M25.511]  Evaluation Date: 12/8/2022  Authorization Period Expiration: 12/31/22  Plan of Care Expiration: 3/30/23  Progress Note Due: 1/8/23  Visit # / Visits authorized: 1/ 1   FOTO: 1/3    Precautions: Standard     Time In: 2:00  Time Out: 2:38  Total Appointment Time (timed & untimed codes): 38 minutes      SUBJECTIVE     Date of onset: October 2022    History of current condition - Merry reports: fell in October 2022 and landed on shoulder which bothered her some but seemed ok until it never got better. She was evaluated later by an ortho PA who diagnosed her with a labral tear. She described the pain as an ache that is worsened by reaching forward or when trying to cook. She does endorse some tingling in digits 3-5. The pain does not usually interrupt her sleep, but it will if she turns wrong.       Falls: yes October 2022    Imaging, x-ray: FINDINGS:  Mild DJD.  No fracture or dislocation.  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 "Pretty much anything will bother it but some things are worse than others"    Pain:  Current 4/10, worst 10/10 - this is momentary, best 2/10   Location: right shoulder    Description: Aching; sharp with movement  Aggravating Factors: reaching forward, cooking, donning seatbelt  Easing Factors: " ice helped initially but not much anymore; tylenol is not helful    Patients goals: regain a reasonable range of motion without pain     Medical History:   Past Medical History:   Diagnosis Date    Anxiety     GERD (gastroesophageal reflux disease)     Glaucoma     Hypertension        Surgical History:   Merry Terrazas  has a past surgical history that includes  section; colonoscopy (2021); Bowel resection; Open reduction and internal fixation (ORIF) of injury of elbow (Left, 2021); and Colon surgery.    Medications:   Merry has a current medication list which includes the following prescription(s): amlodipine, citalopram, covid-19 vaccine, ad25.cov2.s, esomeprazole, latanoprost, losartan-hydrochlorothiazide 100-25 mg, vit c/vit e acet/lutein/min, and vitamin d.    Allergies:   Review of patient's allergies indicates:   Allergen Reactions    Ace inhibitors Swelling          OBJECTIVE     Observation: mild forward head and rounded shoulders    PROM=AROM and remains painful especially at end range     Active Range of Motion:   Shoulder Right Left   Flexion 161 140 at beginning of range of motion    Abduction 182 155 with pain at beginning of range of motion    ER at 0 NT NT   ER at 90 86 85 with pain at beginning and end range of motion    IR To table  70 with some pain        Strength:  Shoulder Right Left   Flexion 3/5 4/5   Abduction 3/5 4/5 with pain   ER 3-/5 with pain 4/5   IR 4-/5 4/5       Special Tests:   Right Left   Empty Can Test + NT   Drop Arm test - but pain with test NT   Subscaputlaris Lift Off - NT   O'lola's test Unable to reach testing position without pain NT   Hawkin's Kenndy + NT   Neer's Test + NT   Anterior Apprehension test + with positive relocation NT   Sulcus Sign NT NT       Joint Mobility: R shoulder glenohumeral mobility normal ; mild hypermobility in L glenohumeral joint     Palpation: no tenderness noted    Sensation: NT    Flexibility:   Lat: R NT ; L  NT   Pec Minor: R NT ; L NT        Limitation/Restriction for FOTO shoulder Survey    Therapist reviewed FOTO scores for Merry Terrazas on 12/8/2022.   FOTO documents entered into [employer] - see Media section.    Limitation Score: 60%         TREATMENT     Total Treatment time (time-based codes) separate from Evaluation: 0 minutes      Merry received the treatments listed below:      therapeutic exercises to develop strength, endurance, ROM, posture, and core stabilization for 0 minutes including:      manual therapy techniques: Joint mobilizations and Soft tissue Mobilization were applied for 0 minutes, including:      neuromuscular re-education activities to improve: Coordination, Proprioception, and Posture for 0 minutes. The following activities were included:            PATIENT EDUCATION AND HOME EXERCISES     Education provided:   - education on condition    Written Home Exercises Provided: not provided today. Exercises were reviewed and Merry was able to demonstrate them prior to the end of the session.  Merry demonstrated good  understanding of the education provided. See EMR under Patient Instructions for exercises provided during therapy sessions.    ASSESSMENT     Merry is a 72 y.o. female referred to outpatient Physical Therapy with a medical diagnosis of Acute pain of right shoulder [M25.511]. Patient presents with right shoulder pain following a fall approx 1.5-2 months ago with high irritability during evaluation today. Objective findings include limitations in both active and passive range of motion with pain and muscle weakness during manual muscle testing. She also presents with several positive special tests and postural dysfunction all of which will benefit from skilled PT for improved overall function and quality of life.     Patient prognosis is Good.   Patient will benefit from skilled outpatient Physical Therapy to address the deficits stated above and in the chart below, provide patient  /family education, and to maximize patientt's level of independence.     Plan of care discussed with patient: Yes  Patient's spiritual, cultural and educational needs considered and patient is agreeable to the plan of care and goals as stated below:     Anticipated Barriers for therapy: none    Medical Necessity is demonstrated by the following  History  Co-morbidities and personal factors that may impact the plan of care Co-morbidities:   Past Medical History:   Diagnosis Date    Anxiety     GERD (gastroesophageal reflux disease)     Glaucoma     Hypertension        Personal Factors:   age     low   Examination  Body Structures and Functions, activity limitations and participation restrictions that may impact the plan of care Body Regions:   upper extremities    Body Systems:    gross symmetry  ROM  strength  gross coordinated movement    Participation Restrictions:   Anything involving use of shoulder    Activity limitations:   Learning and applying knowledge  no deficits    General Tasks and Commands  no deficits    Communication  no deficits    Mobility  lifting and carrying objects    Self care  washing oneself (bathing, drying, washing hands)  dressing  drinking    Domestic Life  shopping  cooking  doing house work (cleaning house, washing dishes, laundry)  assisting others    Interactions/Relationships  no deficits    Life Areas  no deficits    Community and Social Life  community life  recreation and leisure         low   Clinical Presentation stable and uncomplicated low   Decision Making/ Complexity Score: low     GOALS: Short Term Goals:  4 weeks  1.Report decreased R shoulder pain < / =  4/10  to increase tolerance for donning seatbelt  2. Increase PROM by 5-10 degrees where limited   3. Increased strength by 1/3 MMT grade in BUE to increase tolerance for ADL and work activities.  4. Pt to tolerate HEP to improve ROM and independence with ADL's    Long Term Goals: 8 weeks  1.Report decreased R shoulder  pain  < / =  2 /10  to increase tolerance for reaching top cabinets   2.Increase AROM to by 5-10 degrees where limited without pain   3.Increase strength to >/= 4/5 in BUE to increase tolerance for ADL and work activities.  4. Pt goal: Pt to be able to reach forward without increase in shoulder pain  5. Pt will have improved gcode of CJ (20-40% limited) on FOTO shoulder in order to demonstrate true functional improvement.     PLAN   Plan of care Certification: 12/8/2022 to 3/30/23.    Outpatient Physical Therapy 2 times weekly for 10 weeks to include the following interventions: Cervical/Lumbar Traction, Electrical Stimulation  , Gait Training, Manual Therapy, Moist Heat/ Ice, Neuromuscular Re-ed, Patient Education, Therapeutic Activities, Therapeutic Exercise, and Ultrasound.     Kendy Sarmiento, PT      I CERTIFY THE NEED FOR THESE SERVICES FURNISHED UNDER THIS PLAN OF TREATMENT AND WHILE UNDER MY CARE   Physician's comments:     Physician's Signature: ___________________________________________________

## 2023-01-15 ENCOUNTER — OFFICE VISIT (OUTPATIENT)
Dept: URGENT CARE | Facility: CLINIC | Age: 73
End: 2023-01-15
Payer: MEDICARE

## 2023-01-15 VITALS
DIASTOLIC BLOOD PRESSURE: 82 MMHG | HEART RATE: 86 BPM | HEIGHT: 60 IN | RESPIRATION RATE: 18 BRPM | BODY MASS INDEX: 29.84 KG/M2 | OXYGEN SATURATION: 97 % | TEMPERATURE: 98 F | SYSTOLIC BLOOD PRESSURE: 142 MMHG | WEIGHT: 152 LBS

## 2023-01-15 DIAGNOSIS — J40 BRONCHITIS: ICD-10-CM

## 2023-01-15 DIAGNOSIS — R05.2 SUBACUTE COUGH: Primary | ICD-10-CM

## 2023-01-15 DIAGNOSIS — J98.11 ATELECTASIS OF BOTH LUNGS: ICD-10-CM

## 2023-01-15 DIAGNOSIS — K44.9 HIATAL HERNIA: ICD-10-CM

## 2023-01-15 PROCEDURE — 3079F DIAST BP 80-89 MM HG: CPT | Mod: CPTII,S$GLB,, | Performed by: FAMILY MEDICINE

## 2023-01-15 PROCEDURE — 3077F PR MOST RECENT SYSTOLIC BLOOD PRESSURE >= 140 MM HG: ICD-10-PCS | Mod: CPTII,S$GLB,, | Performed by: FAMILY MEDICINE

## 2023-01-15 PROCEDURE — 1160F PR REVIEW ALL MEDS BY PRESCRIBER/CLIN PHARMACIST DOCUMENTED: ICD-10-PCS | Mod: CPTII,S$GLB,, | Performed by: FAMILY MEDICINE

## 2023-01-15 PROCEDURE — 71046 XR CHEST PA AND LATERAL: ICD-10-PCS | Mod: FY,S$GLB,, | Performed by: RADIOLOGY

## 2023-01-15 PROCEDURE — 99214 PR OFFICE/OUTPT VISIT, EST, LEVL IV, 30-39 MIN: ICD-10-PCS | Mod: S$GLB,,, | Performed by: FAMILY MEDICINE

## 2023-01-15 PROCEDURE — 71046 X-RAY EXAM CHEST 2 VIEWS: CPT | Mod: FY,S$GLB,, | Performed by: RADIOLOGY

## 2023-01-15 PROCEDURE — 3077F SYST BP >= 140 MM HG: CPT | Mod: CPTII,S$GLB,, | Performed by: FAMILY MEDICINE

## 2023-01-15 PROCEDURE — 1159F MED LIST DOCD IN RCRD: CPT | Mod: CPTII,S$GLB,, | Performed by: FAMILY MEDICINE

## 2023-01-15 PROCEDURE — 3079F PR MOST RECENT DIASTOLIC BLOOD PRESSURE 80-89 MM HG: ICD-10-PCS | Mod: CPTII,S$GLB,, | Performed by: FAMILY MEDICINE

## 2023-01-15 PROCEDURE — 1159F PR MEDICATION LIST DOCUMENTED IN MEDICAL RECORD: ICD-10-PCS | Mod: CPTII,S$GLB,, | Performed by: FAMILY MEDICINE

## 2023-01-15 PROCEDURE — 3008F BODY MASS INDEX DOCD: CPT | Mod: CPTII,S$GLB,, | Performed by: FAMILY MEDICINE

## 2023-01-15 PROCEDURE — 99214 OFFICE O/P EST MOD 30 MIN: CPT | Mod: S$GLB,,, | Performed by: FAMILY MEDICINE

## 2023-01-15 PROCEDURE — 3008F PR BODY MASS INDEX (BMI) DOCUMENTED: ICD-10-PCS | Mod: CPTII,S$GLB,, | Performed by: FAMILY MEDICINE

## 2023-01-15 PROCEDURE — 1160F RVW MEDS BY RX/DR IN RCRD: CPT | Mod: CPTII,S$GLB,, | Performed by: FAMILY MEDICINE

## 2023-01-15 PROCEDURE — 1126F AMNT PAIN NOTED NONE PRSNT: CPT | Mod: CPTII,S$GLB,, | Performed by: FAMILY MEDICINE

## 2023-01-15 PROCEDURE — 1126F PR PAIN SEVERITY QUANTIFIED, NO PAIN PRESENT: ICD-10-PCS | Mod: CPTII,S$GLB,, | Performed by: FAMILY MEDICINE

## 2023-01-15 RX ORDER — BENZONATATE 200 MG/1
200 CAPSULE ORAL 3 TIMES DAILY PRN
Qty: 30 CAPSULE | Refills: 0 | Status: SHIPPED | OUTPATIENT
Start: 2023-01-15 | End: 2023-01-25

## 2023-01-15 RX ORDER — PREDNISONE 20 MG/1
40 TABLET ORAL DAILY
Qty: 10 TABLET | Refills: 0 | Status: SHIPPED | OUTPATIENT
Start: 2023-01-15 | End: 2023-01-15 | Stop reason: SDUPTHER

## 2023-01-15 RX ORDER — ALBUTEROL SULFATE 90 UG/1
2 AEROSOL, METERED RESPIRATORY (INHALATION) EVERY 6 HOURS PRN
Qty: 18 G | Refills: 0 | Status: SHIPPED | OUTPATIENT
Start: 2023-01-15 | End: 2023-01-15 | Stop reason: SDUPTHER

## 2023-01-15 RX ORDER — PREDNISONE 20 MG/1
40 TABLET ORAL DAILY
Qty: 10 TABLET | Refills: 0 | Status: SHIPPED | OUTPATIENT
Start: 2023-01-15 | End: 2023-01-20

## 2023-01-15 RX ORDER — PROMETHAZINE HYDROCHLORIDE AND DEXTROMETHORPHAN HYDROBROMIDE 6.25; 15 MG/5ML; MG/5ML
5 SYRUP ORAL EVERY 4 HOURS PRN
Qty: 240 ML | Refills: 0 | Status: SHIPPED | OUTPATIENT
Start: 2023-01-15 | End: 2023-01-25

## 2023-01-15 RX ORDER — ALBUTEROL SULFATE 90 UG/1
2 AEROSOL, METERED RESPIRATORY (INHALATION) EVERY 6 HOURS PRN
Qty: 18 G | Refills: 0 | Status: SHIPPED | OUTPATIENT
Start: 2023-01-15

## 2023-01-15 RX ORDER — PROMETHAZINE HYDROCHLORIDE AND DEXTROMETHORPHAN HYDROBROMIDE 6.25; 15 MG/5ML; MG/5ML
5 SYRUP ORAL EVERY 4 HOURS PRN
Qty: 240 ML | Refills: 0 | Status: SHIPPED | OUTPATIENT
Start: 2023-01-15 | End: 2023-01-15 | Stop reason: SDUPTHER

## 2023-01-15 RX ORDER — BENZONATATE 200 MG/1
200 CAPSULE ORAL 3 TIMES DAILY PRN
Qty: 30 CAPSULE | Refills: 0 | Status: SHIPPED | OUTPATIENT
Start: 2023-01-15 | End: 2023-01-15 | Stop reason: SDUPTHER

## 2023-01-15 NOTE — PROGRESS NOTES
Subjective:       Patient ID: Merry Terrazas is a 73 y.o. female.    Vitals:  height is 5' (1.524 m) and weight is 68.9 kg (152 lb). Her oral temperature is 98.2 °F (36.8 °C). Her blood pressure is 142/82 (abnormal) and her pulse is 86. Her respiration is 18 and oxygen saturation is 97%.     Chief Complaint: Cough (Also nasal congestion/post-nasal drip.  No fever - Entered by patient)    This is a 73 y.o. female who presents today with a chief complaint of cough x 2 weeks , post nasal drip. Pt has been taking mucinex dm but no relief. Pt also states she does not want to be tested for Covid or flu.      Cough  This is a recurrent problem. The current episode started 1 to 4 weeks ago. The problem has been unchanged. The cough is Non-productive. Associated symptoms include postnasal drip. The symptoms are aggravated by lying down. She has tried OTC cough suppressant (mucinex) for the symptoms. The treatment provided no relief. There is no history of asthma, bronchiectasis, bronchitis, COPD, emphysema, environmental allergies or pneumonia.     HENT:  Positive for postnasal drip.    Respiratory:  Positive for cough.    Allergic/Immunologic: Negative for environmental allergies.     Objective:      Vitals:    01/15/23 1359   BP: (!) 142/82   Pulse: 86   Resp: 18   Temp: 98.2 °F (36.8 °C)   TempSrc: Oral   SpO2: 97%   Weight: 68.9 kg (152 lb)   Height: 5' (1.524 m)      Physical Exam   Constitutional: She is oriented to person, place, and time. She appears well-developed. She is cooperative.  Non-toxic appearance. She does not appear ill. No distress.   HENT:   Head: Normocephalic and atraumatic.   Ears:   Right Ear: Hearing, tympanic membrane, external ear and ear canal normal.   Left Ear: Hearing, tympanic membrane, external ear and ear canal normal.   Nose: Congestion present. No mucosal edema, rhinorrhea or nasal deformity. No epistaxis. Right sinus exhibits no maxillary sinus tenderness and no frontal sinus  tenderness. Left sinus exhibits no maxillary sinus tenderness and no frontal sinus tenderness.   Mouth/Throat: Uvula is midline, oropharynx is clear and moist and mucous membranes are normal. No trismus in the jaw. Normal dentition. No uvula swelling. No oropharyngeal exudate, posterior oropharyngeal edema or posterior oropharyngeal erythema.   Eyes: Conjunctivae and lids are normal. No scleral icterus.   Neck: Trachea normal and phonation normal. Neck supple. No edema present. No erythema present. No neck rigidity present.   Cardiovascular: Normal rate, regular rhythm, normal heart sounds and normal pulses.   Pulmonary/Chest: Effort normal and breath sounds normal. No respiratory distress. She has no decreased breath sounds. She has no rhonchi.   Abdominal: Normal appearance and bowel sounds are normal. Soft.   Musculoskeletal: Normal range of motion.         General: Normal range of motion.   Neurological: no focal deficit. She is alert and oriented to person, place, and time. She exhibits normal muscle tone. Coordination normal.   Skin: Skin is warm, intact and not diaphoretic. Capillary refill takes less than 2 seconds.   Psychiatric: Her speech is normal and behavior is normal. Judgment and thought content normal.   Nursing note and vitals reviewed.      XR CHEST PA AND LATERAL    Result Date: 1/15/2023  EXAMINATION: XR CHEST PA AND LATERAL CLINICAL HISTORY: rule out pneumonia; Subacute cough TECHNIQUE: PA and lateral views of the chest were performed. COMPARISON: None FINDINGS: There is a large hiatal hernia present.  Air-fluid level is noted in presumed herniated stomach. Cardiac silhouette appears within normal limits. Mild bibasilar atelectasis.  No large effusion.  No evidence of pneumothorax.  No acute osseous abnormality.     Large hiatal hernia with air-fluid level.  Recommend clinical correlation and follow-up. Probable mild bibasilar atelectasis. Electronically signed by: Matthew Godfrey  Date:    01/15/2023 Time:    15:13    Assessment:       1. Subacute cough    2. Bronchitis    3. Atelectasis of both lungs    4. Hiatal hernia          Plan:         Subacute cough  -     XR CHEST PA AND LATERAL; Future; Expected date: 01/15/2023    2. Bronchitis  -     albuterol (VENTOLIN HFA) 90 mcg/actuation inhaler; Inhale 2 puffs into the lungs every 6 (six) hours as needed for Wheezing. Rescue. Ok to substitute based on availability: Proair, Proventil, ventolin  Dispense: 18 g; Refill: 0  -     benzonatate (TESSALON) 200 MG capsule; Take 1 capsule (200 mg total) by mouth 3 (three) times daily as needed for Cough.  Dispense: 30 capsule; Refill: 0  -     predniSONE (DELTASONE) 20 MG tablet; Take 2 tablets (40 mg total) by mouth once daily. for 5 days  Dispense: 10 tablet; Refill: 0  -     promethazine-dextromethorphan (PROMETHAZINE-DM) 6.25-15 mg/5 mL Syrp; Take 5 mLs by mouth every 4 (four) hours as needed (cough).  Dispense: 240 mL; Refill: 0    3. Atelectasis of both lungs  Patient knows how to use inhaler. I have prescribed albuterol inhaler to open up the lungs.    4. Hiatal hernia seen on Chest X-ray  She is away of this diagnosis. Manages conservatively. Continue Nexium. Large hernia contributing to reflux can exacerbate cough.      Patient Instructions   Seek immediate care in the emergency room in the event of severe abdominal pain, chest pain, respiratory distress, fever unresponsive to antipyretic, dehydration, loss of consciousness, seizure.

## 2023-01-19 ENCOUNTER — PATIENT MESSAGE (OUTPATIENT)
Dept: REHABILITATION | Facility: HOSPITAL | Age: 73
End: 2023-01-19

## 2023-01-20 ENCOUNTER — CLINICAL SUPPORT (OUTPATIENT)
Dept: REHABILITATION | Facility: HOSPITAL | Age: 73
End: 2023-01-20
Payer: MEDICARE

## 2023-01-20 DIAGNOSIS — M25.511 ACUTE PAIN OF RIGHT SHOULDER: Primary | ICD-10-CM

## 2023-01-20 DIAGNOSIS — M62.81 MUSCLE WEAKNESS OF UPPER EXTREMITY: ICD-10-CM

## 2023-01-20 PROCEDURE — 97110 THERAPEUTIC EXERCISES: CPT | Mod: HCNC,PO

## 2023-01-20 NOTE — PROGRESS NOTES
[facility]   Physical Therapy Treatment Note / Reassessment / Updated Plan of Care    Name: Merry Terrazas  Clinic Number: [id number]    Therapy Diagnosis:   Encounter Diagnoses   Name Primary?    Acute pain of right shoulder Yes    Muscle weakness of upper extremity      Physician: Clement Bryant PA*    Visit Date: 1/20/2023    Physician Orders: PT Eval and Treat   Medical Diagnosis from Referral: Acute pain of right shoulder [M25.511]  Evaluation Date: 12/8/2022  Authorization Period Expiration: 12/31/22  Plan of Care Expiration: 3/30/23  Progress Note Due: 2/20/23  Visit # / Visits authorized: 1/ 16   FOTO: 1/3  PTA Visit #: 0/5     Precautions: Standard, recent history of carotid endartectomy with healing incision on left anterior neck    Time In: 2:00  Time Out: 2:50  Total Billable Time: 50 minutes      SUBJECTIVE     Pt reports: has missed visits due to carotid enterectomy on December 22, 2022. The right shoulder is a little better than it was but is still giving her problems when she tries to pour something or stir a pot. When the arm is straight down or elevated in the sagittal plane there is no difficulty, only when slightly abducted and elevated.   She was not provided with home exercise program.  Response to previous treatment: IE performed   Functional change: ongoing    Pain: 4/10 with movement  Location:  right shoulder         OBJECTIVE     Observation: mild forward head and rounded shoulders     PROM=AROM and remains painful especially at end range      Active Range of Motion:   Shoulder Right Left   Flexion 154 140 at beginning of range of motion    Abduction 122 with pain  155 with pain at beginning of range of motion    ER at 0 NT NT   ER at 90 50 with pain beginning at 35 degrees 85 with pain at beginning and end range of motion    IR To table  70 with some pain         Strength:  Shoulder Right Left   Flexion 3/5 4/5   Abduction 3/5 4/5 with pain   ER 3-/5  "with pain 4/5   IR 4-/5 4/5         Special Tests:    Right Left   Empty Can Test + NT   Drop Arm test - but pain with test NT   Subscaputlaris Lift Off - NT   O'lola's test Unable to reach testing position without pain NT   Hawkin's Kenndy + NT   Neer's Test + NT   Anterior Apprehension test + with positive relocation NT   Sulcus Sign NT NT         Joint Mobility: R glenohumeral mild hypomobility ; mild hypermobility in L glenohumeral joint      Palpation: tenderness over biceps tendon and pec minor/major laterally on right arm             Limitation/Restriction for FOTO shoulder Survey     Therapist reviewed FOTO scores for Merry Terrazas on 12/8/2022.   FOTO documents entered into [employer] - see Media section.     Limitation Score: 60%           TREATMENT      Merry received the treatments listed below:       therapeutic exercises to develop strength, endurance, ROM, posture, and core stabilization for 50 minutes including:  Scap squeezes 30x3"   Seated upper trunk rotation 15x3" B  seated external rotation PROM with dowel x30  Standing shoulder abduction PROM with dowel x30  Banister slides into abduction x30  Banister slides into flexion x30  Shoulder rows with red theraband 3x10  Shoulder extension with yellow theraband 3x10     Consider:  Seated AROM bilateral external rotation  Serratus slides      manual therapy techniques: Joint mobilizations and Soft tissue Mobilization were applied for 0 minutes, including:        neuromuscular re-education activities to improve: Coordination, Proprioception, and Posture for 0 minutes. The following activities were included:    PATIENT EDUCATION AND HOME EXERCISES     Home Exercises Provided and Patient Education Provided     Education provided:   - education on condition     Written Home Exercises Provided: not provided today. Exercises were reviewed and Merry was able to demonstrate them prior to the end of the session.  Merry demonstrated good  understanding of the " education provided. See EMR under Patient Instructions for exercises provided during therapy sessions.    ASSESSMENT     Subjectively, Ms. Sousa is reporting an improvement in her condition although objectively there is some loss in range of motion. She is able to achieve more motion passively than actively indicating that there is likely muscular involvement. Today session focused on range of motion and muscle activation. We will continue to progress as tolerated.     Merry Is progressing towards her goals.   Pt prognosis is Good.     Pt will continue to benefit from skilled outpatient physical therapy to address the deficits listed in the problem list box on initial evaluation, provide pt/family education and to maximize pt's level of independence in the home and community environment.     Pt's spiritual, cultural and educational needs considered and pt agreeable to plan of care and goals.     Anticipated barriers to physical therapy: none    Goals:   Short Term Goals:  4 weeks  1.Report decreased R shoulder pain < / =  4/10  to increase tolerance for donning seatbelt  2. Increase PROM by 5-10 degrees where limited   3. Increased strength by 1/3 MMT grade in BUE to increase tolerance for ADL and work activities.  4. Pt to tolerate HEP to improve ROM and independence with ADL's     Long Term Goals: 8 weeks  1.Report decreased R shoulder pain  < / =  2 /10  to increase tolerance for reaching top cabinets   2.Increase AROM to by 5-10 degrees where limited without pain   3.Increase strength to >/= 4/5 in BUE to increase tolerance for ADL and work activities.  4. Pt goal: Pt to be able to reach forward without increase in shoulder pain  5. Pt will have improved gcode of CJ (20-40% limited) on FOTO shoulder in order to demonstrate true functional improvement.     PLAN     Plan of care Certification: 12/8/2022 to 3/30/23.     Outpatient Physical Therapy 2 times weekly for 10 weeks to include the following  interventions: Cervical/Lumbar Traction, Electrical Stimulation  , Gait Training, Manual Therapy, Moist Heat/ Ice, Neuromuscular Re-ed, Patient Education, Therapeutic Activities, Therapeutic Exercise, and Ultrasound.     Continue plan of care    Kendy Sarmiento, PT

## 2023-01-24 ENCOUNTER — CLINICAL SUPPORT (OUTPATIENT)
Dept: REHABILITATION | Facility: HOSPITAL | Age: 73
End: 2023-01-24
Payer: MEDICARE

## 2023-01-24 DIAGNOSIS — M62.81 MUSCLE WEAKNESS OF UPPER EXTREMITY: ICD-10-CM

## 2023-01-24 DIAGNOSIS — M25.511 ACUTE PAIN OF RIGHT SHOULDER: Primary | ICD-10-CM

## 2023-01-24 PROCEDURE — 97140 MANUAL THERAPY 1/> REGIONS: CPT | Mod: HCNC,PO

## 2023-01-24 PROCEDURE — 97110 THERAPEUTIC EXERCISES: CPT | Mod: HCNC,PO

## 2023-01-24 NOTE — PROGRESS NOTES
"  [facility]   Physical Therapy Treatment Note    Name: Merry Terrazas  Clinic Number: [id number]    Therapy Diagnosis:   Encounter Diagnoses   Name Primary?    Acute pain of right shoulder Yes    Muscle weakness of upper extremity        Physician: Clement Bryant PA*    Visit Date: 1/24/2023    Physician Orders: PT Eval and Treat   Medical Diagnosis from Referral: Acute pain of right shoulder [M25.511]  Evaluation Date: 12/8/2022  Authorization Period Expiration: 12/31/23  Plan of Care Expiration: 3/30/23  Progress Note Due: 2/20/23  Visit # / Visits authorized: 1/ 20  FOTO: 1/3  PTA Visit #: 0/5     Precautions: Standard, recent history of carotid endartectomy with healing incision on left anterior neck    Time In: 3:15  Time Out: 3:58  Total Billable Time: 43 minutes      SUBJECTIVE     Pt reports: has missed visits due to carotid enterectomy on December 22, 2022. The right shoulder is a little better than it was but is still giving her problems when she tries to pour something or stir a pot. When the arm is straight down or elevated in the sagittal plane there is no difficulty, only when slightly abducted and elevated.   She was not provided with home exercise program.  Response to previous treatment: IE performed   Functional change: ongoing    Pain: 0/10 at rest ; 1/10 with movement  Location:  right shoulder         OBJECTIVE     Objective measures taken at progress report unless specified otherwise.     TREATMENT      Merry received the treatments listed below:       therapeutic exercises to develop strength, endurance, ROM, posture, and core stabilization for 33 minutes including:  Scap squeezes 30x3"   Seated upper trunk rotation 15x3" B  seated external rotation PROM with dowel 30x3"   Seated AROM bilateral external rotation x30   Standing shoulder abduction PROM with dowel x30 R  Banister slides into abduction x30 R  Banister slides into flexion x30 R  Shoulder rows " with red theraband 3x10  Shoulder extension with yellow theraband 3x10   +Shoulder internal rotation with yellow theraband 2x10  +Quadruped rocks backs with serratus activation 2x10  +Supine serratus punches with 3# 3x8 R    Consider:  Serratus slides      manual therapy techniques: Joint mobilizations and Soft tissue Mobilization were applied for 10 minutes, including:  Long axis glenohumeral distraction  PROM within tolerable range of right glenohumeral joint.         neuromuscular re-education activities to improve: Coordination, Proprioception, and Posture for 0 minutes. The following activities were included:    PATIENT EDUCATION AND HOME EXERCISES     Home Exercises Provided and Patient Education Provided     Education provided:   - education on condition     Written Home Exercises Provided: not provided today. Exercises were reviewed and Merry was able to demonstrate them prior to the end of the session.  Merry demonstrated good  understanding of the education provided. See EMR under Patient Instructions for exercises provided during therapy sessions.    ASSESSMENT     Ms. Sousa with improved activation of rotator cuff musculature today along with less pain reported. She demonstrates improved range of motion with less pain when provided with assist into scapular upward rotation. She has good tolerance to session today. Continue to progress. Plan to provide home exercise program at next visit.     Merry Is progressing towards her goals.   Pt prognosis is Good.     Pt will continue to benefit from skilled outpatient physical therapy to address the deficits listed in the problem list box on initial evaluation, provide pt/family education and to maximize pt's level of independence in the home and community environment.     Pt's spiritual, cultural and educational needs considered and pt agreeable to plan of care and goals.     Anticipated barriers to physical therapy: none    Goals:   Short Term Goals:  4  weeks  1.Report decreased R shoulder pain < / =  4/10  to increase tolerance for donning seatbelt  2. Increase PROM by 5-10 degrees where limited   3. Increased strength by 1/3 MMT grade in BUE to increase tolerance for ADL and work activities.  4. Pt to tolerate HEP to improve ROM and independence with ADL's     Long Term Goals: 8 weeks  1.Report decreased R shoulder pain  < / =  2 /10  to increase tolerance for reaching top cabinets   2.Increase AROM to by 5-10 degrees where limited without pain   3.Increase strength to >/= 4/5 in BUE to increase tolerance for ADL and work activities.  4. Pt goal: Pt to be able to reach forward without increase in shoulder pain  5. Pt will have improved gcode of CJ (20-40% limited) on FOTO shoulder in order to demonstrate true functional improvement.     PLAN     Plan of care Certification: 12/8/2022 to 3/30/23.     Outpatient Physical Therapy 2 times weekly for 10 weeks to include the following interventions: Cervical/Lumbar Traction, Electrical Stimulation  , Gait Training, Manual Therapy, Moist Heat/ Ice, Neuromuscular Re-ed, Patient Education, Therapeutic Activities, Therapeutic Exercise, and Ultrasound.     Continue plan of care    Kendy Sarmiento, PT

## 2023-01-31 ENCOUNTER — CLINICAL SUPPORT (OUTPATIENT)
Dept: REHABILITATION | Facility: HOSPITAL | Age: 73
End: 2023-01-31
Payer: MEDICARE

## 2023-01-31 DIAGNOSIS — M62.81 MUSCLE WEAKNESS OF UPPER EXTREMITY: ICD-10-CM

## 2023-01-31 DIAGNOSIS — M25.511 ACUTE PAIN OF RIGHT SHOULDER: Primary | ICD-10-CM

## 2023-01-31 PROCEDURE — 97110 THERAPEUTIC EXERCISES: CPT | Mod: HCNC,PO,CQ

## 2023-01-31 PROCEDURE — 97140 MANUAL THERAPY 1/> REGIONS: CPT | Mod: HCNC,PO,CQ

## 2023-01-31 NOTE — PROGRESS NOTES
"  [facility]   Physical Therapy Treatment Note    Name: Merry Terrazas  Clinic Number: [id number]    Therapy Diagnosis:   Encounter Diagnoses   Name Primary?    Acute pain of right shoulder Yes    Muscle weakness of upper extremity        Physician: Clement Bryant PA*    Visit Date: 1/31/2023    Physician Orders: PT Eval and Treat   Medical Diagnosis from Referral: Acute pain of right shoulder [M25.511]  Evaluation Date: 12/8/2022  Authorization Period Expiration: 12/31/23  Plan of Care Expiration: 3/30/23  Progress Note Due: 2/20/23  Visit # / Visits authorized: 2/ 20  FOTO: 1/3  PTA Visit #: 1/5     Precautions: Standard, recent history of carotid endartectomy with healing incision on left anterior neck    Time In: 3:15  Time Out: 3:55  Total Billable Time: 40 minutes      SUBJECTIVE     Pt reports: her shoulder feels a little better. No pain at rest, only hurts with certain movements.   She was not provided with home exercise program.  Response to previous treatment: felt fine  Functional change: ongoing    Pain: 0/10 at rest ; 1/10 with movement  Location:  right shoulder         OBJECTIVE     Objective measures taken at progress report unless specified otherwise.     TREATMENT      Merry received the treatments listed below:       therapeutic exercises to develop strength, endurance, ROM, posture, and core stabilization for 30 minutes including:    Scap squeezes 30x3"   Seated upper trunk rotation 15x3" B  Seated external rotation PROM with dowel 30x3"   Seated AROM bilateral external rotation x30  Standing shoulder abduction PROM with dowel x30 R  Banister slides into abduction x30 R  Banister slides into flexion x30 R  Shoulder rows with red theraband 3x10  Shoulder extension with yellow theraband 3x10   Shoulder internal rotation with yellow theraband 2x10  Quadruped rocks backs with serratus activation 2x10  Supine serratus punches with 3# 3x8 R  +Serratus slides 2x10   "   manual therapy techniques: Joint mobilizations and Soft tissue Mobilization were applied for 10 minutes, including:  Long axis glenohumeral distraction  PROM within tolerable range of right glenohumeral joint     neuromuscular re-education activities to improve: Coordination, Proprioception, and Posture for 0 minutes. The following activities were included:    PATIENT EDUCATION AND HOME EXERCISES     Home Exercises Provided and Patient Education Provided     Education provided:   - education on condition     Written Home Exercises Provided: not provided today. Exercises were reviewed and Merry was able to demonstrate them prior to the end of the session.  Merry demonstrated good  understanding of the education provided. See EMR under Patient Instructions for exercises provided during therapy sessions.    ASSESSMENT     Ms. Sousa with good tolerance to tx session today. She demonstrates improved rotator cuff musculature activation with few verbal cues for proper form and muscle engagement with exercises. She required CGA for serratus punches today due to inability to lift R arm. Pt was provided with HEP and red TB and educated on importance of being compliant. Continue to progress.     Merry Is progressing towards her goals.   Pt prognosis is Good.     Pt will continue to benefit from skilled outpatient physical therapy to address the deficits listed in the problem list box on initial evaluation, provide pt/family education and to maximize pt's level of independence in the home and community environment.     Pt's spiritual, cultural and educational needs considered and pt agreeable to plan of care and goals.     Anticipated barriers to physical therapy: none    Goals:   Short Term Goals:  4 weeks  1.Report decreased R shoulder pain < / =  4/10  to increase tolerance for donning seatbelt  2. Increase PROM by 5-10 degrees where limited   3. Increased strength by 1/3 MMT grade in BUE to increase tolerance for ADL  and work activities.  4. Pt to tolerate HEP to improve ROM and independence with ADL's     Long Term Goals: 8 weeks  1.Report decreased R shoulder pain  < / =  2 /10  to increase tolerance for reaching top cabinets   2.Increase AROM to by 5-10 degrees where limited without pain   3.Increase strength to >/= 4/5 in BUE to increase tolerance for ADL and work activities.  4. Pt goal: Pt to be able to reach forward without increase in shoulder pain  5. Pt will have improved gcode of CJ (20-40% limited) on FOTO shoulder in order to demonstrate true functional improvement.     PLAN     Plan of care Certification: 12/8/2022 to 3/30/23.     Outpatient Physical Therapy 2 times weekly for 10 weeks to include the following interventions: Cervical/Lumbar Traction, Electrical Stimulation  , Gait Training, Manual Therapy, Moist Heat/ Ice, Neuromuscular Re-ed, Patient Education, Therapeutic Activities, Therapeutic Exercise, and Ultrasound.     Continue plan of care    Alice Matos, PTA

## 2023-02-01 ENCOUNTER — CLINICAL SUPPORT (OUTPATIENT)
Dept: REHABILITATION | Facility: HOSPITAL | Age: 73
End: 2023-02-01
Payer: MEDICARE

## 2023-02-01 DIAGNOSIS — M62.81 MUSCLE WEAKNESS OF UPPER EXTREMITY: ICD-10-CM

## 2023-02-01 DIAGNOSIS — M25.511 ACUTE PAIN OF RIGHT SHOULDER: Primary | ICD-10-CM

## 2023-02-01 PROCEDURE — 97110 THERAPEUTIC EXERCISES: CPT | Mod: HCNC,PO,CQ

## 2023-02-01 RX ORDER — ATORVASTATIN CALCIUM 80 MG/1
TABLET, FILM COATED ORAL
COMMUNITY
Start: 2022-12-23 | End: 2023-02-01 | Stop reason: SDUPTHER

## 2023-02-01 RX ORDER — ATORVASTATIN CALCIUM 80 MG/1
80 TABLET, FILM COATED ORAL DAILY
Qty: 90 TABLET | Refills: 3 | Status: SHIPPED | OUTPATIENT
Start: 2023-02-01 | End: 2023-10-12

## 2023-02-01 NOTE — PROGRESS NOTES
"  [facility]   Physical Therapy Treatment Note    Name: Merry Terrazas  Clinic Number: [id number]    Therapy Diagnosis:   Encounter Diagnoses   Name Primary?    Acute pain of right shoulder Yes    Muscle weakness of upper extremity        Physician: Clement Bryant PA*    Visit Date: 2/1/2023    Physician Orders: PT Eval and Treat   Medical Diagnosis from Referral: Acute pain of right shoulder [M25.511]  Evaluation Date: 12/8/2022  Authorization Period Expiration: 12/31/23  Plan of Care Expiration: 3/30/23  Progress Note Due: 2/20/23  Visit # / Visits authorized: 3/ 20  FOTO: 1/3  PTA Visit #: 2/5     Precautions: Standard, recent history of carotid endartectomy with healing incision on left anterior neck    Time In: 11:45  Time Out: 12:30  Total Billable Time: 45 minutes      SUBJECTIVE     Pt reports: no new complaints.  She was not provided with home exercise program.  Response to previous treatment: felt fine  Functional change: ongoing    Pain: 0/10 at rest ; 1/10 with movement  Location:  right shoulder        OBJECTIVE     Objective measures taken at progress report unless specified otherwise.     TREATMENT      Merry received the treatments listed below:       therapeutic exercises to develop strength, endurance, ROM, posture, and core stabilization for 45 minutes including:    +UBE 3' fwd/bwd  Scap squeezes 30x3"  Seated shoulder AROM B ER x30  Supine serratus punches with 3# 3x8 R with CGA  +Supine horizontal abduction with RTB 2x10  +Sidelying ER 2x10 R  +Sidelying AAROM shoulder flexion with dowel 2x10  Quadruped rock backs with serratus activation 2x10  Seated external rotation PROM with dowel 30x3''  Seated upper trunk rotation 15x3'' B  Standing shoulder abduction PROM with dowel x30 R  Banister slides into abduction x30 R  Banister slides into flexion x30 R  Shoulder rows with red theraband 3x10  Shoulder extension with yellow theraband 3x10  Shoulder internal " rotation with yellow theraband 2x10  Serratus slides with bolster at wall 2x10     manual therapy techniques: Joint mobilizations and Soft tissue Mobilization were applied for 0 minutes, including:  Long axis glenohumeral distraction  PROM within tolerable range of right glenohumeral joint     neuromuscular re-education activities to improve: Coordination, Proprioception, and Posture for 0 minutes. The following activities were included:    PATIENT EDUCATION AND HOME EXERCISES     Home Exercises Provided and Patient Education Provided     Education provided:   - education on condition     Written Home Exercises Provided: not provided today. Exercises were reviewed and Merry was able to demonstrate them prior to the end of the session.  Merry demonstrated good  understanding of the education provided. See EMR under Patient Instructions for exercises provided during therapy sessions.    ASSESSMENT     Ms. Sousa with good tolerance to tx session with increased workload. She tolerated addition and progression of exercises as noted above with no issues to report. She demonstrates improved rotator cuff musculature activation with few verbal cues for proper form and muscle engagement with exercises. Continue to progress as tolerated.    Merry Is progressing towards her goals.   Pt prognosis is Good.     Pt will continue to benefit from skilled outpatient physical therapy to address the deficits listed in the problem list box on initial evaluation, provide pt/family education and to maximize pt's level of independence in the home and community environment.     Pt's spiritual, cultural and educational needs considered and pt agreeable to plan of care and goals.     Anticipated barriers to physical therapy: none    Goals:   Short Term Goals:  4 weeks  1.Report decreased R shoulder pain < / =  4/10  to increase tolerance for donning seatbelt  2. Increase PROM by 5-10 degrees where limited   3. Increased strength by 1/3 MMT  grade in BUE to increase tolerance for ADL and work activities.  4. Pt to tolerate HEP to improve ROM and independence with ADL's     Long Term Goals: 8 weeks  1.Report decreased R shoulder pain  < / =  2 /10  to increase tolerance for reaching top cabinets   2.Increase AROM to by 5-10 degrees where limited without pain   3.Increase strength to >/= 4/5 in BUE to increase tolerance for ADL and work activities.  4. Pt goal: Pt to be able to reach forward without increase in shoulder pain  5. Pt will have improved gcode of CJ (20-40% limited) on FOTO shoulder in order to demonstrate true functional improvement.     PLAN     Plan of care Certification: 12/8/2022 to 3/30/23.     Outpatient Physical Therapy 2 times weekly for 10 weeks to include the following interventions: Cervical/Lumbar Traction, Electrical Stimulation  , Gait Training, Manual Therapy, Moist Heat/ Ice, Neuromuscular Re-ed, Patient Education, Therapeutic Activities, Therapeutic Exercise, and Ultrasound.     Continue plan of care    Alice Matos, PTA

## 2023-02-06 ENCOUNTER — TELEPHONE (OUTPATIENT)
Dept: PRIMARY CARE CLINIC | Facility: CLINIC | Age: 73
End: 2023-02-06
Payer: MEDICARE

## 2023-02-07 ENCOUNTER — CLINICAL SUPPORT (OUTPATIENT)
Dept: REHABILITATION | Facility: HOSPITAL | Age: 73
End: 2023-02-07
Payer: MEDICARE

## 2023-02-07 DIAGNOSIS — M62.81 MUSCLE WEAKNESS OF UPPER EXTREMITY: ICD-10-CM

## 2023-02-07 DIAGNOSIS — M25.511 ACUTE PAIN OF RIGHT SHOULDER: Primary | ICD-10-CM

## 2023-02-07 DIAGNOSIS — Z00.00 ENCOUNTER FOR MEDICARE ANNUAL WELLNESS EXAM: ICD-10-CM

## 2023-02-07 PROCEDURE — 97110 THERAPEUTIC EXERCISES: CPT | Mod: HCNC,PO,CQ

## 2023-02-07 PROCEDURE — 97140 MANUAL THERAPY 1/> REGIONS: CPT | Mod: HCNC,PO,CQ

## 2023-02-07 NOTE — PROGRESS NOTES
"  [facility]   Physical Therapy Treatment Note    Name: Merry Terrazas  Clinic Number: [id number]    Therapy Diagnosis:   Encounter Diagnoses   Name Primary?    Acute pain of right shoulder Yes    Muscle weakness of upper extremity        Physician: Clement Bryant PA*    Visit Date: 2/7/2023    Physician Orders: PT Eval and Treat   Medical Diagnosis from Referral: Acute pain of right shoulder [M25.511]  Evaluation Date: 12/8/2022  Authorization Period Expiration: 12/31/23  Plan of Care Expiration: 3/30/23  Progress Note Due: 2/20/23  Visit # / Visits authorized: 4/ 20  FOTO: 1/3  PTA Visit #: 3/5     Precautions: Standard, recent history of carotid endartectomy with healing incision on left anterior neck    Time In: 9:15  Time Out: 10:00  Total Billable Time: 45 minutes      SUBJECTIVE     Pt reports: she is able to lift her arm up to 90 degrees but bending the elbow hurts.  She was not provided with home exercise program.  Response to previous treatment: felt fine  Functional change: ongoing    Pain: 0/10 at rest ; 1/10 with movement  Location:  right shoulder        OBJECTIVE     Objective measures taken at progress report unless specified otherwise.     TREATMENT      Merry received the treatments listed below:       therapeutic exercises to develop strength, endurance, ROM, posture, and core stabilization for 37 minutes including:    UBE 3' fwd/bwd  Scap squeezes 30x3"  Seated shoulder AROM B ER x30  Seated external rotation PROM with dowel 30x3''  Supine serratus punches with 3# 3x8 R with CGA  Sidelying ER 2x10 R  Sidelying AAROM shoulder flexion with dowel 2x10  Quadruped rock backs with serratus activation 2x10  Seated upper trunk rotation 15x3'' B  Standing shoulder abduction PROM with dowel x30 R  Banister slides into abduction x30 R  Banister slides into flexion x30 R  Shoulder rows with red theraband 3x10  Shoulder extension with yellow theraband 3x10  Shoulder " internal rotation with yellow theraband 2x10  Serratus slides with bolster at wall 2x10     manual therapy techniques: Joint mobilizations and Soft tissue Mobilization were applied for 8 minutes, including:  Long axis glenohumeral distraction  PROM within tolerable range of right glenohumeral joint     neuromuscular re-education activities to improve: Coordination, Proprioception, and Posture for 0 minutes. The following activities were included:    PATIENT EDUCATION AND HOME EXERCISES     Home Exercises Provided and Patient Education Provided     Education provided:   - education on condition     Written Home Exercises Provided: not provided today. Exercises were reviewed and Merry was able to demonstrate them prior to the end of the session.  Merry demonstrated good  understanding of the education provided. See EMR under Patient Instructions for exercises provided during therapy sessions.    ASSESSMENT     Ms. Sousa continues with improved activation of rotator cuff musculature today with few verbal and tactile cueing. She demonstrates improved shoulder ROM and tolerated manual therapy well with some reports of discomfort with external rotation at end range. Continue to progress as tolerated.    Merry Is progressing towards her goals.   Pt prognosis is Good.     Pt will continue to benefit from skilled outpatient physical therapy to address the deficits listed in the problem list box on initial evaluation, provide pt/family education and to maximize pt's level of independence in the home and community environment.     Pt's spiritual, cultural and educational needs considered and pt agreeable to plan of care and goals.     Anticipated barriers to physical therapy: none    Goals:   Short Term Goals:  4 weeks  1.Report decreased R shoulder pain < / =  4/10  to increase tolerance for donning seatbelt  2. Increase PROM by 5-10 degrees where limited   3. Increased strength by 1/3 MMT grade in BUE to increase  tolerance for ADL and work activities.  4. Pt to tolerate HEP to improve ROM and independence with ADL's     Long Term Goals: 8 weeks  1.Report decreased R shoulder pain  < / =  2 /10  to increase tolerance for reaching top cabinets   2.Increase AROM to by 5-10 degrees where limited without pain   3.Increase strength to >/= 4/5 in BUE to increase tolerance for ADL and work activities.  4. Pt goal: Pt to be able to reach forward without increase in shoulder pain  5. Pt will have improved gcode of CJ (20-40% limited) on FOTO shoulder in order to demonstrate true functional improvement.     PLAN     Plan of care Certification: 12/8/2022 to 3/30/23.     Outpatient Physical Therapy 2 times weekly for 10 weeks to include the following interventions: Cervical/Lumbar Traction, Electrical Stimulation  , Gait Training, Manual Therapy, Moist Heat/ Ice, Neuromuscular Re-ed, Patient Education, Therapeutic Activities, Therapeutic Exercise, and Ultrasound.     Continue plan of care    Alice Matos, PTA

## 2023-02-09 DIAGNOSIS — Z00.00 ENCOUNTER FOR MEDICARE ANNUAL WELLNESS EXAM: ICD-10-CM

## 2023-02-10 ENCOUNTER — CLINICAL SUPPORT (OUTPATIENT)
Dept: REHABILITATION | Facility: HOSPITAL | Age: 73
End: 2023-02-10
Payer: MEDICARE

## 2023-02-10 DIAGNOSIS — M62.81 MUSCLE WEAKNESS OF UPPER EXTREMITY: ICD-10-CM

## 2023-02-10 DIAGNOSIS — M25.511 ACUTE PAIN OF RIGHT SHOULDER: Primary | ICD-10-CM

## 2023-02-10 PROCEDURE — 97110 THERAPEUTIC EXERCISES: CPT | Mod: HCNC,PO,CQ

## 2023-02-10 PROCEDURE — 97140 MANUAL THERAPY 1/> REGIONS: CPT | Mod: HCNC,PO,CQ

## 2023-02-10 NOTE — PROGRESS NOTES
"  [facility]   Physical Therapy Treatment Note    Name: Merry Terrazas  Clinic Number: [id number]    Therapy Diagnosis:   Encounter Diagnoses   Name Primary?    Acute pain of right shoulder Yes    Muscle weakness of upper extremity        Physician: Clement Bryant PA*    Visit Date: 2/10/2023    Physician Orders: PT Eval and Treat   Medical Diagnosis from Referral: Acute pain of right shoulder [M25.511]  Evaluation Date: 12/8/2022  Authorization Period Expiration: 12/31/23  Plan of Care Expiration: 3/30/23  Progress Note Due: 2/20/23  Visit # / Visits authorized: 5/ 20  FOTO: 1/3  PTA Visit #: 4/5     Precautions: Standard, recent history of carotid endartectomy with healing incision on left anterior neck    Time In: 9:00  Time Out: 9:40  Total Billable Time: 40 minutes      SUBJECTIVE     Pt reports: her shoulder feels like it's slowly getting better. She is able to lift her arm up to 90 degrees but remains with pain when bending the elbow.  She was not provided with home exercise program.  Response to previous treatment: felt fine  Functional change: ongoing    Pain: 0/10 at rest ; 1/10 with movement  Location:  right shoulder        OBJECTIVE     Objective measures taken at progress report unless specified otherwise.     TREATMENT      Merry received the treatments listed below:       therapeutic exercises to develop strength, endurance, ROM, posture, and core stabilization for 30 minutes including:    UBE 3' fwd/bwd  Scap squeezes 30x3"  Seated shoulder AROM B ER x30  Supine AAROM shoulder flexion with 3# dowel 2x10  Seated external rotation PROM with dowel 30x3''  Supine serratus punches with 3# 3x8 R with CGA  Sidelying ER 2x10 R  Sidelying AAROM shoulder flexion with dowel 2x10  Quadruped rock backs with serratus activation 2x10  Seated upper trunk rotation 15x3'' B  +Shoulder PNF 1/2 yellow TB 2x10 ea  Standing shoulder abduction PROM with dowel x30 R  Banister slides " into abduction x30 R  Banister slides into flexion x30 R  Shoulder rows with red theraband 3x10  Shoulder extension with yellow theraband 3x10  Shoulder internal rotation with yellow theraband 2x10  Serratus slides with bolster at wall 2x10     manual therapy techniques: Joint mobilizations and Soft tissue Mobilization were applied for 10 minutes, including:  Long axis glenohumeral distraction  PROM within tolerable range of right glenohumeral joint     neuromuscular re-education activities to improve: Coordination, Proprioception, and Posture for 0 minutes. The following activities were included:    PATIENT EDUCATION AND HOME EXERCISES     Home Exercises Provided and Patient Education Provided     Education provided:   - education on condition     Written Home Exercises Provided: not provided today. Exercises were reviewed and Merry was able to demonstrate them prior to the end of the session.  Merry demonstrated good  understanding of the education provided. See EMR under Patient Instructions for exercises provided during therapy sessions.    ASSESSMENT     Ms. Sousa remains with pain with elbow flexion. She tolerated exercises well today with few verbal and tactile cues for proper form and muscle engagement for activation of rotator cuff musculature. Pt was not able to perform sidelying shoulder abduction due to reports of increased pain and difficulty. She tolerated manual therapy well and was able to passively reach functional limits with no pain to report. Continue to progress as tolerated.    Merry Is progressing towards her goals.   Pt prognosis is Good.     Pt will continue to benefit from skilled outpatient physical therapy to address the deficits listed in the problem list box on initial evaluation, provide pt/family education and to maximize pt's level of independence in the home and community environment.     Pt's spiritual, cultural and educational needs considered and pt agreeable to plan of care  and goals.     Anticipated barriers to physical therapy: none    Goals:   Short Term Goals:  4 weeks  1.Report decreased R shoulder pain < / =  4/10  to increase tolerance for donning seatbelt  2. Increase PROM by 5-10 degrees where limited   3. Increased strength by 1/3 MMT grade in BUE to increase tolerance for ADL and work activities.  4. Pt to tolerate HEP to improve ROM and independence with ADL's     Long Term Goals: 8 weeks  1.Report decreased R shoulder pain  < / =  2 /10  to increase tolerance for reaching top cabinets   2.Increase AROM to by 5-10 degrees where limited without pain   3.Increase strength to >/= 4/5 in BUE to increase tolerance for ADL and work activities.  4. Pt goal: Pt to be able to reach forward without increase in shoulder pain  5. Pt will have improved gcode of CJ (20-40% limited) on FOTO shoulder in order to demonstrate true functional improvement.     PLAN     Plan of care Certification: 12/8/2022 to 3/30/23.     Outpatient Physical Therapy 2 times weekly for 10 weeks to include the following interventions: Cervical/Lumbar Traction, Electrical Stimulation  , Gait Training, Manual Therapy, Moist Heat/ Ice, Neuromuscular Re-ed, Patient Education, Therapeutic Activities, Therapeutic Exercise, and Ultrasound.     Continue plan of care    Alice Matos, PTA

## 2023-02-13 NOTE — TELEPHONE ENCOUNTER
Care Due:                  Date            Visit Type   Department     Provider  --------------------------------------------------------------------------------                                EP -                              PRIMARY      [facility] PRIMARY  Last Visit: 11-      CARE ([facility])   CARE           Randy Campos                               -                              PRIMARY  Next Visit: 05-      CARE ([facility])   None Found     Randy Campos                                                            Last  Test          Frequency    Reason                     Performed    Due Date  --------------------------------------------------------------------------------    CMP.........  12 months..  atorvastatin,              03- 02-                             losartan-hydrochlorothiaz                             humberto......................    Lipid Panel.  12 months..  atorvastatin.............  03- 02-    [facility] Embedded Care Gaps. Reference number: [id number]. 2/13/2023   11:37:07 AM CST

## 2023-02-14 ENCOUNTER — CLINICAL SUPPORT (OUTPATIENT)
Dept: REHABILITATION | Facility: HOSPITAL | Age: 73
End: 2023-02-14
Payer: MEDICARE

## 2023-02-14 DIAGNOSIS — M62.81 MUSCLE WEAKNESS OF UPPER EXTREMITY: ICD-10-CM

## 2023-02-14 DIAGNOSIS — M25.511 ACUTE PAIN OF RIGHT SHOULDER: Primary | ICD-10-CM

## 2023-02-14 PROCEDURE — 97110 THERAPEUTIC EXERCISES: CPT | Mod: HCNC,PO

## 2023-02-14 PROCEDURE — 97140 MANUAL THERAPY 1/> REGIONS: CPT | Mod: HCNC,PO

## 2023-02-14 RX ORDER — LOSARTAN POTASSIUM AND HYDROCHLOROTHIAZIDE 25; 100 MG/1; MG/1
1 TABLET ORAL DAILY
Qty: 90 TABLET | Refills: 3 | Status: SHIPPED | OUTPATIENT
Start: 2023-02-14 | End: 2024-03-28

## 2023-02-14 NOTE — TELEPHONE ENCOUNTER
Refill Routing Note   Medication(s) are not appropriate for processing by [facility] for the following reason(s):       Required vitals abnormal    [facility] action(s):  Defer Care gaps identified: Yes     Medication Therapy Plan: Labs (CMP, Lipid panel) due; Last BP abnormal: 01/15/23 (!) 142/82    Appointments  past 12m or future 3m with PCP    Date Provider   Last Visit   11/7/2022 Randy Campos MD   Next Visit   5/8/2023 Randy Campos MD   ED visits in past 90 days: 0        Note composed:6:35 PM 02/13/2023

## 2023-02-16 ENCOUNTER — CLINICAL SUPPORT (OUTPATIENT)
Dept: REHABILITATION | Facility: HOSPITAL | Age: 73
End: 2023-02-16
Payer: MEDICARE

## 2023-02-16 DIAGNOSIS — M62.81 MUSCLE WEAKNESS OF UPPER EXTREMITY: ICD-10-CM

## 2023-02-16 DIAGNOSIS — M25.511 ACUTE PAIN OF RIGHT SHOULDER: Primary | ICD-10-CM

## 2023-02-16 PROCEDURE — 97140 MANUAL THERAPY 1/> REGIONS: CPT | Mod: HCNC,PO

## 2023-02-16 PROCEDURE — 97110 THERAPEUTIC EXERCISES: CPT | Mod: HCNC,PO

## 2023-02-16 NOTE — PROGRESS NOTES
"  [facility]   Physical Therapy Treatment Note    Name: Merry Terrazas  Clinic Number: [id number]    Therapy Diagnosis:   No diagnosis found.      Physician: Clement Bryant PA*    Visit Date: 2/16/2023    Physician Orders: PT Eval and Treat   Medical Diagnosis from Referral: Acute pain of right shoulder [M25.511]  Evaluation Date: 12/8/2022  Authorization Period Expiration: 12/31/23  Plan of Care Expiration: 3/30/23  Progress Note Due: 2/20/23  Visit # / Visits authorized: 5/ 20  FOTO: 1/3  PTA Visit #: 4/5     Precautions: Standard, recent history of carotid endartectomy with healing incision on left anterior neck    Time In: 843 am (early arrival but late check in)  Time Out: 923 am  Total Billable Time: 40 minutes  TE 2 MT 1      SUBJECTIVE     Pt reports: her shoulder feels good no pain just aching which is normal in the morning and no pain when bending the elbow.  She was not provided with home exercise program.  Response to previous treatment: felt fine  Functional change: ongoing    Pain: 0/10 at rest ; 1/10 with movement  Location:  right shoulder        OBJECTIVE     Objective measures taken at progress report unless specified otherwise.     TREATMENT      Merry received the treatments listed below:       therapeutic exercises to develop strength, endurance, ROM, posture, and core stabilization for 30 minutes including:    UBE 3' fwd/bwd  Scap squeezes 30x3"  Seated shoulder AROM B ER x30  Supine AAROM shoulder flexion with 3# dowel 2x10  Supine IR/ER with 2# midrange for eccentric IR control and stretch x 20 slowly  Sidelying ER 2x10 R  Seated external rotation PROM with dowel 30x3''  Supine serratus punches with 3# 3x8 R with CGA  +standing elbow flexion x 10 hands supinated, neutral  and then pronated + 1 #  Sidelying AAROM shoulder flexion with dowel 2x10  Quadruped rock backs with serratus activation 2x10  Seated upper trunk rotation 15x3'' B  +Seated thoracic " extension over half bolster x 20 hold 3 secs  +Shoulder PNF 1/2 yellow TB 2x10 ea  Standing shoulder abduction PROM with dowel x30 R  Banister slides into abduction x30 R  Banister slides into flexion x30 R  Shoulder rows with green theraband 2x 15  Shoulder extension with red  theraband 2 x 15  Shoulder internal rotation with yellow theraband 2x10  Serratus slides with bolster at wall 2x10     manual therapy techniques: Joint mobilizations and Soft tissue Mobilization were applied for 10 minutes, including:  STM to levator and teres minor  Long axis glenohumeral distraction  PROM within tolerable range of right glenohumeral joint     neuromuscular re-education activities to improve: Coordination, Proprioception, and Posture for 0 minutes. The following activities were included:    PATIENT EDUCATION AND HOME EXERCISES     Home Exercises Provided and Patient Education Provided     Education provided:   - education on condition     Written Home Exercises Provided: not provided today. Exercises were reviewed and Merry was able to demonstrate them prior to the end of the session.  Merry demonstrated good  understanding of the education provided. See EMR under Patient Instructions for exercises provided during therapy sessions.    ASSESSMENT     Ms. Sousa remains with pain with elbow flexion mostly resolved and with good pain control overall and tolerated increased workload with decreased rest breaks and increased resistance with band changes. Pt without pain with ADLs or reaching forward and up to her cabinets during the day to achieve set goals.     Merry Is progressing towards her goals.   Pt prognosis is Good.     Pt will continue to benefit from skilled outpatient physical therapy to address the deficits listed in the problem list box on initial evaluation, provide pt/family education and to maximize pt's level of independence in the home and community environment.     Pt's spiritual, cultural and educational  needs considered and pt agreeable to plan of care and goals.     Anticipated barriers to physical therapy: none    Goals:   Short Term Goals:  4 weeks  1.Report decreased R shoulder pain < / =  4/10  to increase tolerance for donning seatbelt  2. Increase PROM by 5-10 degrees where limited   3. Increased strength by 1/3 MMT grade in BUE to increase tolerance for ADL and work activities.  4. Pt to tolerate HEP to improve ROM and independence with ADL's. MET 2/16/23     Long Term Goals: 8 weeks  1.Report decreased R shoulder pain  < / =  2 /10  to increase tolerance for reaching top cabinets. MET 2/16/23  2.Increase AROM to by 5-10 degrees where limited without pain. MET 2/16/23  3.Increase strength to >/= 4/5 in BUE to increase tolerance for ADL and work activities.  4. Pt goal: Pt to be able to reach forward without increase in shoulder pain. MET 2/16/23  5. Pt will have improved gcode of CJ (20-40% limited) on FOTO shoulder in order to demonstrate true functional improvement.     PLAN     Plan of care Certification: 12/8/2022 to 3/30/23.     Outpatient Physical Therapy 2 times weekly for 10 weeks to include the following interventions: Cervical/Lumbar Traction, Electrical Stimulation  , Gait Training, Manual Therapy, Moist Heat/ Ice, Neuromuscular Re-ed, Patient Education, Therapeutic Activities, Therapeutic Exercise, and Ultrasound.     Continue plan of care    Emily Lal, PT

## 2023-03-01 ENCOUNTER — PES CALL (OUTPATIENT)
Dept: ADMINISTRATIVE | Facility: CLINIC | Age: 73
End: 2023-03-01
Payer: MEDICARE

## 2023-03-02 ENCOUNTER — CLINICAL SUPPORT (OUTPATIENT)
Dept: REHABILITATION | Facility: HOSPITAL | Age: 73
End: 2023-03-02
Payer: MEDICARE

## 2023-03-02 DIAGNOSIS — M62.81 MUSCLE WEAKNESS OF UPPER EXTREMITY: ICD-10-CM

## 2023-03-02 DIAGNOSIS — M25.511 ACUTE PAIN OF RIGHT SHOULDER: Primary | ICD-10-CM

## 2023-03-02 PROCEDURE — 97110 THERAPEUTIC EXERCISES: CPT | Mod: HCNC,PO

## 2023-03-02 PROCEDURE — 97140 MANUAL THERAPY 1/> REGIONS: CPT | Mod: HCNC,PO

## 2023-03-02 NOTE — PROGRESS NOTES
[facility]   Physical Therapy Treatment Note / Reassessment    Name: Merry Terrazas  Clinic Number: [id number]    Therapy Diagnosis:   Encounter Diagnoses   Name Primary?    Acute pain of right shoulder Yes    Muscle weakness of upper extremity          Physician: Clement Bryant PA*    Visit Date: 3/2/2023    Physician Orders: PT Eval and Treat   Medical Diagnosis from Referral: Acute pain of right shoulder [M25.511]  Evaluation Date: 12/8/2022  Authorization Period Expiration: 12/31/23  Plan of Care Expiration: 3/30/23  Progress Note Due: 4/2/23  Visit # / Visits authorized: 8/ 20  FOTO: 3/3 - last completed on 3/2/23    PTA Visit #: 0/5     Precautions: Standard, recent history of carotid endartectomy with healing incision on left anterior neck    Time In: 8:45  Time Out: 9:24  Total Billable Time: 39 minutes       SUBJECTIVE     Pt reports: her shoulder has been feeling pretty good   She was not provided with home exercise program.  Response to previous treatment: felt fine  Functional change: ongoing    Pain: 0/10 at rest ; 1/10 with movement  Location:  right shoulder        OBJECTIVE     Observation: mild forward head and rounded shoulders     PROM=AROM and remains painful especially at end range      Active Range of Motion:   Shoulder Right Left   Flexion 165 140 at beginning of range of motion    Abduction 180 with mild pain  155 with pain at beginning of range of motion    ER at 0 NT NT   ER at 90 79 with mild pain 85 with pain at beginning and end range of motion    IR To table  70 with some pain         Strength:  Shoulder Right Left   Flexion 4-/5 with pain  4/5   Abduction 4-/5 with pain  4/5 with pain   ER 3-/5 4/5   IR 4-/5 with pain 4/5         Special Tests:    Right Left   Empty Can Test + NT   Drop Arm test - but pain with test NT   Subscaputlaris Lift Off - NT   O'lola's test Unable to reach testing position without pain NT   Hawkin's Kenndy + NT   Neer's  "Test + NT   Anterior Apprehension test + with positive relocation NT   Sulcus Sign NT NT         Joint Mobility: R glenohumeral mild hypomobility ; mild hypermobility in L glenohumeral joint      Palpation: tenderness over biceps tendon and pec minor/major laterally on right arm              Limitation/Restriction for FOTO shoulder Survey     Therapist reviewed FOTO scores for Merry Terrazas on 3/2/2023.   FOTO documents entered into [employer] - see Media section.     Limitation Score: 43%          TREATMENT      Merry received the treatments listed below:       therapeutic exercises to develop strength, endurance, ROM, posture, and core stabilization for 30 minutes including:    UBE 3' fwd/bwd  Scap squeezes 30x3"  Seated bilateral shoulder external rotation with yellow theraband 3x10  Supine IR/ER with eccentric IR 3x10 - pain with activity  Sidelying ER 2x10 R - pain with activity  +Sidelying abduction (approx  degrees) 2x10 R  +Sidelying flexion 2x10 R  Seated external rotation PROM with dowel 30x3''  Supine serratus punches with 3# 3x8 R with CGA  standing elbow flexion supinated/neutral/pronated x10 each - pain with activity  Sidelying AAROM shoulder flexion with dowel 2x10  Quadruped rock backs with serratus activation 2x10  Seated upper trunk rotation 15x3'' B  Seated thoracic extension over half bolster x 20 hold 3 secs  Shoulder PNF 1/2 yellow TB 2x10 ea  Shoulder rows with green theraband 2x 15  Shoulder extension with red  theraband 2 x 15  Shoulder internal rotation with yellow theraband 2x10  Serratus slides with bolster at wall 2x10     manual therapy techniques: Joint mobilizations and Soft tissue Mobilization were applied for 9 minutes, including:  STM to levator and teres minor  STM to pec and bicep  Posterior and inferior glenohumeral glides   Long axis glenohumeral distraction  PROM within tolerable range of right glenohumeral joint     neuromuscular re-education activities to improve: " Coordination, Proprioception, and Posture for 0 minutes. The following activities were included:    PATIENT EDUCATION AND HOME EXERCISES     Home Exercises Provided and Patient Education Provided     Education provided:   - education on condition     Written Home Exercises Provided: not provided today. Exercises were reviewed and Merry was able to demonstrate them prior to the end of the session.  Merry demonstrated good  understanding of the education provided. See EMR under Patient Instructions for exercises provided during therapy sessions.    ASSESSMENT     Ms. Sousa presents today with improvement in pain free range but with continued pain at end range of all motion and pain and difficulty with resistance into external rotation of the right shoulder. Overall, she is completing more ADLs with less pain. We will continue to progress rotator cuff strength as tolerated. Reduced resistance on several exercises today due to pain with activity as noted in treatment section above.     Merry Is progressing towards her goals.   Pt prognosis is Good.     Pt will continue to benefit from skilled outpatient physical therapy to address the deficits listed in the problem list box on initial evaluation, provide pt/family education and to maximize pt's level of independence in the home and community environment.     Pt's spiritual, cultural and educational needs considered and pt agreeable to plan of care and goals.     Anticipated barriers to physical therapy: none    Goals:   Short Term Goals:  4 weeks   1.Report decreased R shoulder pain < / =  4/10  to increase tolerance for donning seatbelt MET  2. Increase PROM by 5-10 degrees where limited not assessed  3. Increased strength by 1/3 MMT grade in BUE to increase tolerance for ADL and work activities. MET  4. Pt to tolerate HEP to improve ROM and independence with ADL's. MET 2/16/23     Long Term Goals: 8 weeks  1.Report decreased R shoulder pain  < / =  2 /10  to  increase tolerance for reaching top cabinets. MET 2/16/23  2.Increase AROM to by 5-10 degrees where limited without pain. MET 2/16/23  3.Increase strength to >/= 4/5 in BUE to increase tolerance for ADL and work activities.  4. Pt goal: Pt to be able to reach forward without increase in shoulder pain. MET 2/16/23  5. Pt will have improved gcode of CJ (20-40% limited) on FOTO shoulder in order to demonstrate true functional improvement.     PLAN     Plan of care Certification: 12/8/2022 to 3/30/23.     Outpatient Physical Therapy 2 times weekly for 10 weeks to include the following interventions: Cervical/Lumbar Traction, Electrical Stimulation  , Gait Training, Manual Therapy, Moist Heat/ Ice, Neuromuscular Re-ed, Patient Education, Therapeutic Activities, Therapeutic Exercise, and Ultrasound.     Continue plan of care    Kendy Sarmiento, PT

## 2023-03-07 ENCOUNTER — CLINICAL SUPPORT (OUTPATIENT)
Dept: REHABILITATION | Facility: HOSPITAL | Age: 73
End: 2023-03-07
Payer: MEDICARE

## 2023-03-07 DIAGNOSIS — M62.81 MUSCLE WEAKNESS OF UPPER EXTREMITY: ICD-10-CM

## 2023-03-07 DIAGNOSIS — M25.511 ACUTE PAIN OF RIGHT SHOULDER: Primary | ICD-10-CM

## 2023-03-07 PROCEDURE — 97110 THERAPEUTIC EXERCISES: CPT | Mod: HCNC,PO

## 2023-03-07 PROCEDURE — 97140 MANUAL THERAPY 1/> REGIONS: CPT | Mod: HCNC,PO

## 2023-03-07 NOTE — PROGRESS NOTES
[facility]   Physical Therapy Treatment Note / Reassessment    Name: Merry Terrazas  Clinic Number: [id number]    Therapy Diagnosis:   Encounter Diagnoses   Name Primary?    Acute pain of right shoulder Yes    Muscle weakness of upper extremity          Physician: Clement Bryant PA*    Visit Date: 3/7/2023    Physician Orders: PT Eval and Treat   Medical Diagnosis from Referral: Acute pain of right shoulder [M25.511]  Evaluation Date: 12/8/2022  Authorization Period Expiration: 12/31/23  Plan of Care Expiration: 3/30/23  Progress Note Due: 4/2/23  Visit # / Visits authorized: 8/ 20  FOTO: 3/3 - last completed on 3/2/23    PTA Visit #: 0/5     Precautions: Standard, recent history of carotid endartectomy with healing incision on left anterior neck    Time In: 315 pm  Time Out: 400 pm  Total Billable Time: 45 minutes  TE 2, MT 1      SUBJECTIVE     Pt reports: her shoulder has been feeling pretty good   She was not provided with home exercise program.  Response to previous treatment: felt fine  Functional change: ongoing    Pain: 0/10 at rest ; 1/10 with movement  Location:  right shoulder        OBJECTIVE     Observation: mild forward head and rounded shoulders     PROM=AROM and remains painful especially at end range      Active Range of Motion:   Shoulder Right Left   Flexion 165 140 at beginning of range of motion    Abduction 180 with mild pain  155 with pain at beginning of range of motion    ER at 0 NT NT   ER at 90 79 with mild pain 85 with pain at beginning and end range of motion    IR To table  70 with some pain         Strength:  Shoulder Right Left   Flexion 4-/5 with pain  4/5   Abduction 4-/5 with pain  4/5 with pain   ER 3-/5 4/5   IR 4-/5 with pain 4/5         Special Tests:    Right Left   Empty Can Test + NT   Drop Arm test - but pain with test NT   Subscaputlaris Lift Off - NT   O'lola's test Unable to reach testing position without pain NT   Hawkin's Kenndy +  "NT   Neer's Test + NT   Anterior Apprehension test + with positive relocation NT   Sulcus Sign NT NT         Joint Mobility: R glenohumeral mild hypomobility ; mild hypermobility in L glenohumeral joint      Palpation: tenderness over biceps tendon and pec minor/major laterally on right arm              Limitation/Restriction for FOTO shoulder Survey     Therapist reviewed FOTO scores for Merry Terrazas on 3/2/2023.   FOTO documents entered into [employer] - see Media section.     Limitation Score: 43%          TREATMENT      Merry received the treatments listed below:       therapeutic exercises to develop strength, endurance, ROM, posture, and core stabilization for 35 minutes including:    UBE 3' fwd/bwd  Scap squeezes 30x3"  Seated bilateral shoulder external rotation with yellow theraband 3x10  Supine IR/ER with eccentric IR 3x10 - pain with activity  Sidelying ER 2x10 R - pain with activity  +Sidelying abduction (approx  degrees) 2x10 R  +Sidelying flexion 2x10 R  Seated external rotation PROM with dowel 30x3''    Supine bilateral horizontal abduction YTB 2 x 15  Supine serratus punches with 3# 3x8 R with CGA  standing elbow flexion supinated/neutral/pronated x10 each - pain with activity  Sidelying AAROM shoulder flexion with dowel 2x10  Quadruped rock backs with serratus activation 2x10  Seated upper trunk rotation 15x3'' B  Seated thoracic extension over half bolster x 20 hold 3 secs  Shoulder PNF 1/2 yellow TB 2x10 ea  Shoulder rows with green theraband 3x 15  Shoulder extension with red  theraband 3 x 15  Shoulder internal rotation with yellow theraband 2x10  Serratus slides with bolster at wall 2x10     manual therapy techniques: Joint mobilizations and Soft tissue Mobilization were applied for 10 minutes, including:  STM to levator and teres minor  STM to pec and bicep  Posterior and inferior glenohumeral glides   Long axis glenohumeral distraction  PROM within tolerable range of right glenohumeral " joint     neuromuscular re-education activities to improve: Coordination, Proprioception, and Posture for 0 minutes. The following activities were included:    PATIENT EDUCATION AND HOME EXERCISES     Home Exercises Provided and Patient Education Provided     Education provided:   - education on condition     Written Home Exercises Provided: not provided today. Exercises were reviewed and Merry was able to demonstrate them prior to the end of the session.  Merry demonstrated good  understanding of the education provided. See EMR under Patient Instructions for exercises provided during therapy sessions.    ASSESSMENT     Ms. Sousa with good tolerance with exercise and decreased overall pain with WNL/WFL AROM in session. Pt continues to progress with overall AROM and exercise tolerance. Pt with good insight and levator still the greatest limiting factors.     Merry Is progressing towards her goals.   Pt prognosis is Good.     Pt will continue to benefit from skilled outpatient physical therapy to address the deficits listed in the problem list box on initial evaluation, provide pt/family education and to maximize pt's level of independence in the home and community environment.     Pt's spiritual, cultural and educational needs considered and pt agreeable to plan of care and goals.     Anticipated barriers to physical therapy: none    Goals:   Short Term Goals:  4 weeks   1.Report decreased R shoulder pain < / =  4/10  to increase tolerance for donning seatbelt MET  2. Increase PROM by 5-10 degrees where limited not assessed  3. Increased strength by 1/3 MMT grade in BUE to increase tolerance for ADL and work activities. MET  4. Pt to tolerate HEP to improve ROM and independence with ADL's. MET 2/16/23     Long Term Goals: 8 weeks  1.Report decreased R shoulder pain  < / =  2 /10  to increase tolerance for reaching top cabinets. MET 2/16/23  2.Increase AROM to by 5-10 degrees where limited without pain. MET  2/16/23  3.Increase strength to >/= 4/5 in BUE to increase tolerance for ADL and work activities.  4. Pt goal: Pt to be able to reach forward without increase in shoulder pain. MET 2/16/23  5. Pt will have improved gcode of CJ (20-40% limited) on FOTO shoulder in order to demonstrate true functional improvement.     PLAN     Plan of care Certification: 12/8/2022 to 3/30/23.     Outpatient Physical Therapy 2 times weekly for 10 weeks to include the following interventions: Cervical/Lumbar Traction, Electrical Stimulation  , Gait Training, Manual Therapy, Moist Heat/ Ice, Neuromuscular Re-ed, Patient Education, Therapeutic Activities, Therapeutic Exercise, and Ultrasound.     Continue plan of care    Emily Lal, PT

## 2023-03-09 ENCOUNTER — CLINICAL SUPPORT (OUTPATIENT)
Dept: REHABILITATION | Facility: HOSPITAL | Age: 73
End: 2023-03-09
Payer: MEDICARE

## 2023-03-09 DIAGNOSIS — M25.511 ACUTE PAIN OF RIGHT SHOULDER: Primary | ICD-10-CM

## 2023-03-09 DIAGNOSIS — M62.81 MUSCLE WEAKNESS OF UPPER EXTREMITY: ICD-10-CM

## 2023-03-09 PROCEDURE — 97110 THERAPEUTIC EXERCISES: CPT | Mod: HCNC,PO

## 2023-03-09 PROCEDURE — 97140 MANUAL THERAPY 1/> REGIONS: CPT | Mod: HCNC,PO

## 2023-03-09 NOTE — PROGRESS NOTES
[facility]   Physical Therapy Treatment Note / Reassessment    Name: Merry Terrazas  Clinic Number: [id number]    Therapy Diagnosis:   Encounter Diagnoses   Name Primary?    Acute pain of right shoulder Yes    Muscle weakness of upper extremity            Physician: Clement Bryant PA*    Visit Date: 3/9/2023    Physician Orders: PT Eval and Treat   Medical Diagnosis from Referral: Acute pain of right shoulder [M25.511]  Evaluation Date: 12/8/2022  Authorization Period Expiration: 12/31/23  Plan of Care Expiration: 3/30/23  Progress Note Due: 4/2/23  Visit # / Visits authorized: 8/ 20  FOTO: 3/3 - last completed on 3/2/23    PTA Visit #: 0/5     Precautions: Standard, recent history of carotid endartectomy with healing incision on left anterior neck    Time In: 847 am  Time Out: 930 am  Total Billable Time: 43 minutes  TE 2, MT 1      SUBJECTIVE     Pt reports: her shoulder has been feeling pretty good and pain free since last session.   She was not provided with home exercise program.  Response to previous treatment: felt fine  Functional change: ongoing    Pain: 0/10 at rest ; 0/10 with movement  Location:  right shoulder        OBJECTIVE     Observation: mild forward head and rounded shoulders     PROM=AROM and remains painful especially at end range      Active Range of Motion:   Shoulder Right Left   Flexion 165 140 at beginning of range of motion    Abduction 180 with mild pain  155 with pain at beginning of range of motion    ER at 0 NT NT   ER at 90 79 with mild pain 85 with pain at beginning and end range of motion    IR To table  70 with some pain         Strength:  Shoulder Right Left   Flexion 4-/5 with pain  4/5   Abduction 4-/5 with pain  4/5 with pain   ER 3-/5 4/5   IR 4-/5 with pain 4/5         Special Tests:    Right Left   Empty Can Test + NT   Drop Arm test - but pain with test NT   Subscaputlaris Lift Off - NT   O'lola's test Unable to reach testing position  "without pain NT   Hawkin's Kenndy + NT   Neer's Test + NT   Anterior Apprehension test + with positive relocation NT   Sulcus Sign NT NT         Joint Mobility: R glenohumeral mild hypomobility ; mild hypermobility in L glenohumeral joint      Palpation: tenderness over biceps tendon and pec minor/major laterally on right arm              Limitation/Restriction for FOTO shoulder Survey     Therapist reviewed FOTO scores for Merry Terrazas on 3/2/2023.   FOTO documents entered into [facility] - see Media section.     Limitation Score: 43%          TREATMENT      Merry received the treatments listed below:       therapeutic exercises to develop strength, endurance, ROM, posture, and core stabilization for 30 minutes including:    UBE 3' fwd/bwd  Towel roll 2" vertical x 2 min  Towel roll 2" horizontal x 2 min at T 4 level  Scap squeezes 30x3"  Seated bilateral shoulder external rotation with yellow theraband 3x10  Supine IR/ER with eccentric IR 3x10 - pain with activity  Sidelying ER 2x10 R - pain with activity  +Sidelying abduction (approx  degrees) 2x10 R  +Sidelying flexion 2x10 R  Seated external rotation PROM with dowel 30x3''    Supine bilateral horizontal abduction YTB 2 x 15  Supine serratus punches with 3# 3x8 R with CGA  standing elbow flexion supinated/neutral/pronated x10 each - pain with activity  Sidelying AAROM shoulder flexion with dowel 2x10  Quadruped rock backs with serratus activation 2x10  Seated upper trunk rotation 15x3'' B  Seated thoracic extension over half bolster x 20 hold 3 secs  Shoulder PNF 1/2 red TB 2x10 ea  Shoulder rows with green theraband 3x 15  Shoulder extension with red  theraband 3 x 15  Shoulder internal rotation with yellow theraband 2x10  Serratus slides with bolster at wall 2x10     manual therapy techniques: Joint mobilizations and Soft tissue Mobilization were applied for 13 minutes, including:  STM to levator and teres minor  STM to pec and bicep  Posterior and " inferior glenohumeral glides   Long axis glenohumeral distraction  PROM within tolerable range of right glenohumeral joint     neuromuscular re-education activities to improve: Coordination, Proprioception, and Posture for 0 minutes. The following activities were included:    PATIENT EDUCATION AND HOME EXERCISES     Home Exercises Provided and Patient Education Provided     Education provided:   - education on condition     Written Home Exercises Provided: not provided today. Exercises were reviewed and Merry was able to demonstrate them prior to the end of the session.  Merry demonstrated good  understanding of the education provided. See EMR under Patient Instructions for exercises provided during therapy sessions.    ASSESSMENT     Ms. Sousa with good tolerance with exercise and pain free since last visit without any issues or pain for ADLs. Pt with good insight and tolerance with continued proper pain management and compliance.       Merry Is progressing towards her goals.   Pt prognosis is Good.     Pt will continue to benefit from skilled outpatient physical therapy to address the deficits listed in the problem list box on initial evaluation, provide pt/family education and to maximize pt's level of independence in the home and community environment.     Pt's spiritual, cultural and educational needs considered and pt agreeable to plan of care and goals.     Anticipated barriers to physical therapy: none    Goals:   Short Term Goals:  4 weeks   1.Report decreased R shoulder pain < / =  4/10  to increase tolerance for donning seatbelt MET  2. Increase PROM by 5-10 degrees where limited not assessed  3. Increased strength by 1/3 MMT grade in BUE to increase tolerance for ADL and work activities. MET  4. Pt to tolerate HEP to improve ROM and independence with ADL's. MET 2/16/23     Long Term Goals: 8 weeks  1.Report decreased R shoulder pain  < / =  2 /10  to increase tolerance for reaching top cabinets. MET  2/16/23  2.Increase AROM to by 5-10 degrees where limited without pain. MET 2/16/23  3.Increase strength to >/= 4/5 in BUE to increase tolerance for ADL and work activities.  4. Pt goal: Pt to be able to reach forward without increase in shoulder pain. MET 2/16/23  5. Pt will have improved gcode of CJ (20-40% limited) on FOTO shoulder in order to demonstrate true functional improvement.     PLAN     Plan of care Certification: 12/8/2022 to 3/30/23.     Outpatient Physical Therapy 2 times weekly for 10 weeks to include the following interventions: Cervical/Lumbar Traction, Electrical Stimulation  , Gait Training, Manual Therapy, Moist Heat/ Ice, Neuromuscular Re-ed, Patient Education, Therapeutic Activities, Therapeutic Exercise, and Ultrasound.     Continue plan of care    Emily Lal, PT

## 2023-03-21 ENCOUNTER — CLINICAL SUPPORT (OUTPATIENT)
Dept: REHABILITATION | Facility: HOSPITAL | Age: 73
End: 2023-03-21
Payer: MEDICARE

## 2023-03-21 DIAGNOSIS — M25.511 ACUTE PAIN OF RIGHT SHOULDER: Primary | ICD-10-CM

## 2023-03-21 DIAGNOSIS — M62.81 MUSCLE WEAKNESS OF UPPER EXTREMITY: ICD-10-CM

## 2023-03-21 PROCEDURE — 97110 THERAPEUTIC EXERCISES: CPT | Mod: HCNC,PO,CQ

## 2023-03-21 NOTE — PROGRESS NOTES
"  [facility]   Physical Therapy Treatment Note    Name: Merry Terrazas  Clinic Number: [id number]    Therapy Diagnosis:   Encounter Diagnoses   Name Primary?    Acute pain of right shoulder Yes    Muscle weakness of upper extremity        Physician: Clement Bryant PA*    Visit Date: 3/21/2023    Physician Orders: PT Eval and Treat   Medical Diagnosis from Referral: Acute pain of right shoulder [M25.511]  Evaluation Date: 12/8/2022  Authorization Period Expiration: 12/31/23  Plan of Care Expiration: 3/30/23  Progress Note Due: 4/2/23  Visit # / Visits authorized: 11/ 20  FOTO: 3/3 - last completed on 3/2/23    PTA Visit #: 1/5     Precautions: Standard, recent history of carotid endartectomy with healing incision on left anterior neck    Time In: 8:30 am  Time Out: 9:10 am  Total Billable Time: 40 minutes  TE 3, MT 0      SUBJECTIVE     Pt reports: her shoulder has been feeling pretty good and pain free since last session.   She was not provided with home exercise program.  Response to previous treatment: felt fine  Functional change: ongoing    Pain: 0/10 at rest ; 0/10 with movement  Location:  right shoulder        OBJECTIVE       TREATMENT      Merry received the treatments listed below:       therapeutic exercises to develop strength, endurance, ROM, posture, and core stabilization for 40 minutes including:    UBE 3' fwd/bwd - DC  Towel roll 2" vertical x 2 min  Towel roll 2" horizontal x 2 min at T 4 level  Scap squeezes 30x3"  Seated bilateral shoulder external rotation with yellow theraband 3x10  Supine IR/ER with eccentric IR 3x10 - pain with activity  Sidelying ER 2x10 R - pain with activity  Sidelying abduction (approx  degrees) 2x10 R  Sidelying flexion 2x10 R  Seated external rotation PROM with dowel 30x3''    Supine bilateral horizontal abduction YTB 2 x 15  Supine serratus punches with 3# 3x8 R with CGA  standing elbow flexion supinated/neutral/pronated x10 " each - pain with activity  Sidelying AAROM shoulder flexion with dowel 2x10  Quadruped rock backs with serratus activation 2x10  Seated upper trunk rotation 15x3'' B  Seated thoracic extension over half bolster x 20 hold 3 secs  Shoulder PNF 1/2 red TB 2x10 ea  Shoulder rows with green theraband 3x 15  Shoulder extension with red theraband 3 x 15  Shoulder internal rotation with yellow theraband 2x10  Shoulder external rotation with yellow theraband 2x10  Serratus slides with bolster at wall 2x10     manual therapy techniques: Joint mobilizations and Soft tissue Mobilization were applied for 0 minutes, including:  STM to levator and teres minor  STM to pec and bicep  Posterior and inferior glenohumeral glides   Long axis glenohumeral distraction  PROM within tolerable range of right glenohumeral joint     neuromuscular re-education activities to improve: Coordination, Proprioception, and Posture for 0 minutes. The following activities were included:    PATIENT EDUCATION AND HOME EXERCISES     Home Exercises Provided and Patient Education Provided     Education provided:   - education on condition     Written Home Exercises Provided: not provided today. Exercises were reviewed and Merry was able to demonstrate them prior to the end of the session.  Merry demonstrated good  understanding of the education provided. See EMR under Patient Instructions for exercises provided during therapy sessions.    ASSESSMENT     Ms. Sousa presents to treatment with minimal to no pain. Pt with good tolerance to treatment session today and performs all exercises with no issues or reports of increased pain. She remains limited with ER AROM on the R shoulder. Encouraged continued compliance at home with HEP and ER stretches. Pt with good insight overall with continued proper pain management and compliance.    Merry Is progressing towards her goals.   Pt prognosis is Good.     Pt will continue to benefit from skilled outpatient  physical therapy to address the deficits listed in the problem list box on initial evaluation, provide pt/family education and to maximize pt's level of independence in the home and community environment.     Pt's spiritual, cultural and educational needs considered and pt agreeable to plan of care and goals.     Anticipated barriers to physical therapy: none    Goals:   Short Term Goals:  4 weeks   1.Report decreased R shoulder pain < / =  4/10  to increase tolerance for donning seatbelt MET  2. Increase PROM by 5-10 degrees where limited not assessed  3. Increased strength by 1/3 MMT grade in BUE to increase tolerance for ADL and work activities. MET  4. Pt to tolerate HEP to improve ROM and independence with ADL's. MET 2/16/23     Long Term Goals: 8 weeks  1.Report decreased R shoulder pain  < / =  2 /10  to increase tolerance for reaching top cabinets. MET 2/16/23  2.Increase AROM to by 5-10 degrees where limited without pain. MET 2/16/23  3.Increase strength to >/= 4/5 in BUE to increase tolerance for ADL and work activities.  4. Pt goal: Pt to be able to reach forward without increase in shoulder pain. MET 2/16/23  5. Pt will have improved gcode of CJ (20-40% limited) on FOTO shoulder in order to demonstrate true functional improvement.     PLAN     Plan of care Certification: 12/8/2022 to 3/30/23.     Outpatient Physical Therapy 2 times weekly for 10 weeks to include the following interventions: Cervical/Lumbar Traction, Electrical Stimulation  , Gait Training, Manual Therapy, Moist Heat/ Ice, Neuromuscular Re-ed, Patient Education, Therapeutic Activities, Therapeutic Exercise, and Ultrasound.     Continue plan of care    Alice Matos, PTA

## 2023-03-29 ENCOUNTER — CLINICAL SUPPORT (OUTPATIENT)
Dept: REHABILITATION | Facility: HOSPITAL | Age: 73
End: 2023-03-29
Attending: INTERNAL MEDICINE
Payer: MEDICARE

## 2023-03-29 DIAGNOSIS — M25.511 ACUTE PAIN OF RIGHT SHOULDER: Primary | ICD-10-CM

## 2023-03-29 DIAGNOSIS — M62.81 MUSCLE WEAKNESS OF UPPER EXTREMITY: ICD-10-CM

## 2023-03-29 PROCEDURE — 97110 THERAPEUTIC EXERCISES: CPT | Mod: HCNC,PO

## 2023-03-29 PROCEDURE — 97140 MANUAL THERAPY 1/> REGIONS: CPT | Mod: HCNC,PO

## 2023-03-29 NOTE — PROGRESS NOTES
"  [facility]   Physical Therapy Treatment Note    Name: Merry Terrazas  Clinic Number: [id number]    Therapy Diagnosis:   Encounter Diagnoses   Name Primary?    Acute pain of right shoulder Yes    Muscle weakness of upper extremity          Physician: Clement Bryant PA*    Visit Date: 3/29/2023    Physician Orders: PT Eval and Treat   Medical Diagnosis from Referral: Acute pain of right shoulder [M25.511]  Evaluation Date: 12/8/2022  Authorization Period Expiration: 12/31/23  Plan of Care Expiration: 3/30/23  Progress Note Due: 4/2/23  Visit # / Visits authorized: 12/ 20  FOTO: 3/3 - last completed on 3/2/23    PTA Visit #: 0/5     Precautions: Standard, recent history of carotid endartectomy with healing incision on left anterior neck    Time In: 8:50  Time Out: 9:30  Total Billable Time: 40 minutes       SUBJECTIVE     Pt reports: feeling better, just getting over a cold   She was not provided with home exercise program.  Response to previous treatment: felt fine  Functional change: ongoing    Pain: 2/10 at rest ; 0/10 with movement  Location:  right shoulder        OBJECTIVE     Objective measures taken at progress report unless specified otherwise.     TREATMENT      Merry received the treatments listed below:       therapeutic exercises to develop strength, endurance, ROM, posture, and core stabilization for 30 minutes including:    UBE 3' fwd/bwd - DC  Towel roll 2" vertical x 2 min  Towel roll 2" horizontal x 2 min at T 4 level  Scap squeezes 30x3"  Seated bilateral shoulder external rotation with yellow theraband 3x10  Supine IR/ER with eccentric IR 3x10 - pain with activity  Sidelying ER 2x10 R - pain with activity  Sidelying abduction (approx  degrees) 2x10 R  Sidelying flexion 2x10 R  Seated external rotation PROM with dowel 30x3''    Supine bilateral horizontal abduction YTB 2 x 15  Supine serratus punches with 3# 3x8 R with CGA  standing elbow flexion " supinated/neutral/pronated x10 each - pain with activity  Sidelying AAROM shoulder flexion with dowel 2x10  Quadruped rock backs with serratus activation 2x10  Seated upper trunk rotation 15x3'' B  Seated thoracic extension over half bolster x 20 hold 3 secs  Shoulder PNF 1/2 red TB 2x10 ea  Shoulder rows with green theraband 3 x 15  Shoulder extension with green theraband 3 x 15  Shoulder internal rotation with yellow theraband 2x10  Shoulder external rotation with yellow theraband 2x10 - unable to perform  Serratus slides with bolster at wall 2x10     manual therapy techniques: Joint mobilizations and Soft tissue Mobilization were applied for 10 minutes, including:  STM to levator and teres minor  STM to pec and bicep  Posterior and inferior glenohumeral glides   Long axis glenohumeral distraction  PROM within tolerable range of right glenohumeral joint     neuromuscular re-education activities to improve: Coordination, Proprioception, and Posture for 0 minutes. The following activities were included:    PATIENT EDUCATION AND HOME EXERCISES     Home Exercises Provided and Patient Education Provided     Education provided:   - education on condition     Written Home Exercises Provided: not provided today. Exercises were reviewed and Merry was able to demonstrate them prior to the end of the session.  Merry demonstrated good  understanding of the education provided. See EMR under Patient Instructions for exercises provided during therapy sessions.    ASSESSMENT     Ms. Sousa presents to treatment with minimal pain today. She remains with significant difficulty with external rotation of the right arm. We will continue to progress as tolerated. Reassessment to be completed at next visit.     Merry Is progressing towards her goals.   Pt prognosis is Good.     Pt will continue to benefit from skilled outpatient physical therapy to address the deficits listed in the problem list box on initial evaluation, provide  pt/family education and to maximize pt's level of independence in the home and community environment.     Pt's spiritual, cultural and educational needs considered and pt agreeable to plan of care and goals.     Anticipated barriers to physical therapy: none    Goals:   Short Term Goals:  4 weeks   1.Report decreased R shoulder pain < / =  4/10  to increase tolerance for donning seatbelt MET  2. Increase PROM by 5-10 degrees where limited not assessed  3. Increased strength by 1/3 MMT grade in BUE to increase tolerance for ADL and work activities. MET  4. Pt to tolerate HEP to improve ROM and independence with ADL's. MET 2/16/23     Long Term Goals: 8 weeks  1.Report decreased R shoulder pain  < / =  2 /10  to increase tolerance for reaching top cabinets. MET 2/16/23  2.Increase AROM to by 5-10 degrees where limited without pain. MET 2/16/23  3.Increase strength to >/= 4/5 in BUE to increase tolerance for ADL and work activities.  4. Pt goal: Pt to be able to reach forward without increase in shoulder pain. MET 2/16/23  5. Pt will have improved gcode of CJ (20-40% limited) on FOTO shoulder in order to demonstrate true functional improvement.     PLAN     Plan of care Certification: 12/8/2022 to 3/30/23.     Outpatient Physical Therapy 2 times weekly for 10 weeks to include the following interventions: Cervical/Lumbar Traction, Electrical Stimulation  , Gait Training, Manual Therapy, Moist Heat/ Ice, Neuromuscular Re-ed, Patient Education, Therapeutic Activities, Therapeutic Exercise, and Ultrasound.     Continue plan of care    Kendy Sarmiento, PT

## 2023-03-30 ENCOUNTER — OFFICE VISIT (OUTPATIENT)
Dept: URGENT CARE | Facility: CLINIC | Age: 73
End: 2023-03-30
Payer: MEDICARE

## 2023-03-30 VITALS
OXYGEN SATURATION: 95 % | WEIGHT: 152 LBS | SYSTOLIC BLOOD PRESSURE: 119 MMHG | HEIGHT: 60 IN | HEART RATE: 86 BPM | BODY MASS INDEX: 29.84 KG/M2 | TEMPERATURE: 99 F | RESPIRATION RATE: 18 BRPM | DIASTOLIC BLOOD PRESSURE: 65 MMHG

## 2023-03-30 DIAGNOSIS — R09.82 POST-NASAL DRAINAGE: ICD-10-CM

## 2023-03-30 DIAGNOSIS — J06.9 VIRAL URI WITH COUGH: Primary | ICD-10-CM

## 2023-03-30 PROCEDURE — 99213 OFFICE O/P EST LOW 20 MIN: CPT | Mod: S$GLB,,,

## 2023-03-30 PROCEDURE — 99213 PR OFFICE/OUTPT VISIT, EST, LEVL III, 20-29 MIN: ICD-10-PCS | Mod: S$GLB,,,

## 2023-03-30 RX ORDER — BENZONATATE 200 MG/1
200 CAPSULE ORAL 3 TIMES DAILY PRN
Qty: 30 CAPSULE | Refills: 0 | Status: SHIPPED | OUTPATIENT
Start: 2023-03-30 | End: 2023-04-09

## 2023-03-30 RX ORDER — FLUTICASONE PROPIONATE 50 MCG
1 SPRAY, SUSPENSION (ML) NASAL DAILY
Qty: 9.9 ML | Refills: 0 | Status: SHIPPED | OUTPATIENT
Start: 2023-03-30 | End: 2023-04-29

## 2023-03-30 RX ORDER — CETIRIZINE HYDROCHLORIDE 10 MG/1
10 TABLET ORAL DAILY
Qty: 30 TABLET | Refills: 0 | Status: SHIPPED | OUTPATIENT
Start: 2023-03-30 | End: 2023-05-22

## 2023-03-30 NOTE — PATIENT INSTRUCTIONS
- Rest.    - Drink plenty of fluids.    - Acetaminophen (tylenol) or Ibuprofen (advil,motrin) as directed as needed for fever/pain. Avoid tylenol if you have a history of liver disease. Do not take ibuprofen if you have a history of GI bleeding, kidney disease, or if you take blood thinners.   - Ibuprofen dosing for adults: 400 mg by mouth every 4-6 hours as needed. Max: 2400 mg/day; Info: use lowest effective dose, shortest effective treatment duration; give w/ food if GI upset occurs.  - Tylenol dosing for adults: [By mouth route, immediate-release form] Dose: 325-1000 mg by mouth every 4-6h as needed; Max: 1 g/4h and 4 g/day from all sources. [By mouth route, extended-release form] Dose: 650-1300 mg Extended Release by mouth every 8h as needed; Max: 4 g/day from all sources.     - You must understand that you have received an Urgent Care treatment only and that you may be released before all of your medical problems are known or treated.   - You, the patient, will arrange for follow up care as instructed.   - If your condition worsens or fails to improve we recommend that you receive another evaluation at the ER immediately or contact your PCP to discuss your concerns or return here.   - Follow up with your PCP or specialty clinic as directed in the next 1-2 weeks if not improved or as needed.  You can call [phone] to schedule an appointment with the appropriate provider.    If your symptoms do not improve or worsen, go to the emergency room immediately.

## 2023-03-30 NOTE — PROGRESS NOTES
Subjective:      Patient ID: Merry Terrazas is a 73 y.o. female.    Vitals:  height is 5' (1.524 m) and weight is 68.9 kg (152 lb). Her oral temperature is 98.6 °F (37 °C). Her blood pressure is 119/65 and her pulse is 86. Her respiration is 18 and oxygen saturation is 95%.     Chief Complaint: Cough    This is a 73 y.o. female who presents today with a chief complaint of  cough x 4 days. Pt took mucinex and some decongestants. Pt state she does not want to be tested for covid or flu today. She denies body aches or chills. She has been taking mucinex DM and OTC cough medicine with mild relief.     Cough  This is a recurrent problem. The current episode started in the past 7 days. The problem has been unchanged. The problem occurs constantly. The cough is Productive of sputum. Associated symptoms include nasal congestion, postnasal drip and wheezing. Pertinent negatives include no chest pain, chills, ear congestion, ear pain, fever, headaches, heartburn, hemoptysis, myalgias, rash, rhinorrhea, sore throat, shortness of breath, sweats or weight loss. Nothing aggravates the symptoms. Treatments tried: mucinex. The treatment provided no relief. There is no history of asthma, bronchiectasis, bronchitis, COPD, emphysema, environmental allergies or pneumonia.   Constitution: Negative for chills and fever.   HENT:  Positive for postnasal drip. Negative for ear pain and sore throat.    Cardiovascular:  Negative for chest pain.   Respiratory:  Positive for cough, sputum production and wheezing. Negative for bloody sputum and shortness of breath.    Gastrointestinal:  Negative for heartburn.   Musculoskeletal:  Negative for muscle ache.   Skin:  Negative for rash and hives.   Allergic/Immunologic: Negative for environmental allergies, hives and itching.   Neurological:  Negative for headaches, disorientation and altered mental status.   Psychiatric/Behavioral:  Negative for altered mental status and disorientation.      Objective:     Physical Exam   Constitutional: She is oriented to person, place, and time. She appears well-developed. She is cooperative.  Non-toxic appearance. She does not appear ill. No distress.      Comments:Patient sits comfortably in exam chair. Answers questions in complete sentences. Does not show any signs of distress or discoloration.        HENT:   Head: Normocephalic and atraumatic.   Ears:   Right Ear: Hearing, tympanic membrane, external ear and ear canal normal. impacted cerumen  Left Ear: Hearing, tympanic membrane, external ear and ear canal normal. impacted cerumen  Nose: Congestion present. No mucosal edema, rhinorrhea or nasal deformity. No epistaxis. Right sinus exhibits no maxillary sinus tenderness and no frontal sinus tenderness. Left sinus exhibits no maxillary sinus tenderness and no frontal sinus tenderness.   Mouth/Throat: Uvula is midline, oropharynx is clear and moist and mucous membranes are normal. No trismus in the jaw. Normal dentition. No uvula swelling. No oropharyngeal exudate, posterior oropharyngeal edema or posterior oropharyngeal erythema. No tonsillar exudate.   Eyes: Conjunctivae and lids are normal. No scleral icterus.   Neck: Trachea normal and phonation normal. Neck supple. No edema present. No erythema present. No neck rigidity present.   Cardiovascular: Normal rate, regular rhythm, normal heart sounds and normal pulses.   Pulmonary/Chest: Effort normal and breath sounds normal. No stridor. No respiratory distress. She has no decreased breath sounds. She has no wheezes. She has no rhonchi. She has no rales.   Cough with deep inspiration. No wheezing auscultated.          Comments: Cough with deep inspiration. No wheezing auscultated.     Abdominal: Normal appearance.   Musculoskeletal: Normal range of motion.         General: No deformity. Normal range of motion.   Lymphadenopathy:     She has no cervical adenopathy.        Right cervical: No superficial cervical,  no deep cervical and no posterior cervical adenopathy present.       Left cervical: No superficial cervical, no deep cervical and no posterior cervical adenopathy present.   Neurological: She is alert and oriented to person, place, and time. She exhibits normal muscle tone. Coordination normal.   Skin: Skin is warm, dry, intact, not diaphoretic and not pale.   Psychiatric: Her speech is normal and behavior is normal. Judgment and thought content normal.   Nursing note and vitals reviewed.    Assessment:     1. Viral URI with cough    2. Post-nasal drainage        Plan:       Viral URI with cough    Post-nasal drainage  -     benzonatate (TESSALON) 200 MG capsule; Take 1 capsule (200 mg total) by mouth 3 (three) times daily as needed for Cough.  Dispense: 30 capsule; Refill: 0  -     fluticasone propionate (FLONASE) 50 mcg/actuation nasal spray; 1 spray (50 mcg total) by Each Nostril route once daily.  Dispense: 9.9 mL; Refill: 0  -     cetirizine (ZYRTEC) 10 MG tablet; Take 1 tablet (10 mg total) by mouth once daily.  Dispense: 30 tablet; Refill: 0                Patient Instructions   - Rest.    - Drink plenty of fluids.    - Acetaminophen (tylenol) or Ibuprofen (advil,motrin) as directed as needed for fever/pain. Avoid tylenol if you have a history of liver disease. Do not take ibuprofen if you have a history of GI bleeding, kidney disease, or if you take blood thinners.   - Ibuprofen dosing for adults: 400 mg by mouth every 4-6 hours as needed. Max: 2400 mg/day; Info: use lowest effective dose, shortest effective treatment duration; give w/ food if GI upset occurs.  - Tylenol dosing for adults: [By mouth route, immediate-release form] Dose: 325-1000 mg by mouth every 4-6h as needed; Max: 1 g/4h and 4 g/day from all sources. [By mouth route, extended-release form] Dose: 650-1300 mg Extended Release by mouth every 8h as needed; Max: 4 g/day from all sources.     - You must understand that you have received an  Urgent Care treatment only and that you may be released before all of your medical problems are known or treated.   - You, the patient, will arrange for follow up care as instructed.   - If your condition worsens or fails to improve we recommend that you receive another evaluation at the ER immediately or contact your PCP to discuss your concerns or return here.   - Follow up with your PCP or specialty clinic as directed in the next 1-2 weeks if not improved or as needed.  You can call [phone] to schedule an appointment with the appropriate provider.    If your symptoms do not improve or worsen, go to the emergency room immediately.

## 2023-04-05 ENCOUNTER — CLINICAL SUPPORT (OUTPATIENT)
Dept: REHABILITATION | Facility: HOSPITAL | Age: 73
End: 2023-04-05
Attending: INTERNAL MEDICINE
Payer: MEDICARE

## 2023-04-05 DIAGNOSIS — M62.81 MUSCLE WEAKNESS OF UPPER EXTREMITY: ICD-10-CM

## 2023-04-05 DIAGNOSIS — M25.511 ACUTE PAIN OF RIGHT SHOULDER: Primary | ICD-10-CM

## 2023-04-05 PROCEDURE — 97110 THERAPEUTIC EXERCISES: CPT | Mod: HCNC,PO

## 2023-04-05 NOTE — PROGRESS NOTES
[facility]   Physical Therapy Treatment Note / Updated POC / Reassessment / Discharge Summary    Name: Merry Terrazas  Clinic Number: [id number]    Therapy Diagnosis:   Encounter Diagnoses   Name Primary?    Acute pain of right shoulder Yes    Muscle weakness of upper extremity            Physician: Clement Bryant PA*    Visit Date: 4/5/2023    Physician Orders: PT Eval and Treat   Medical Diagnosis from Referral: Acute pain of right shoulder [M25.511]  Evaluation Date: 12/8/2022  Authorization Period Expiration: 12/31/23  Plan of Care Expiration: 7/5/23  Progress Note Due: 5/5/23  Visit # / Visits authorized: 13/ 20  FOTO: 4/3 - EPISODE COMPLETED AND CLOSED  PTA Visit #: 0/5     Precautions: Standard, recent history of carotid endartectomy with healing incision on left anterior neck    Time In: 8:45  Time Out: 9:25  Total Billable Time: 40 minutes       SUBJECTIVE     Pt reports: shoulder has been doing pretty well, much better than when she started  She was not provided with home exercise program.  Response to previous treatment: felt fine  Functional change: ongoing    Pain: 0/10 at rest ; 1/10 with movement  Location:  right shoulder        OBJECTIVE     Observation: mild forward head and rounded shoulders     PROM=AROM and remains painful especially at end range      Active Range of Motion:   Shoulder Right Left   Flexion 165 140 at beginning of range of motion    Abduction 180 155 with pain at beginning of range of motion    ER at 0 NT NT   ER at 90 82 with mild pain 85 with pain at beginning and end range of motion    IR To table  70 with some pain         Strength:  Shoulder Right Left   Flexion 4-/5 with pain  4/5   Abduction 4-/5 with pain  4/5 with pain   ER 3-/5 with mild pain  4/5   IR 4-/5 with pain 4/5         Special Tests:    Right Left   Empty Can Test + NT   Drop Arm test - but pain with test NT   Subscaputlaris Lift Off - NT   O'lola's test Unable to reach  "testing position without pain NT   Hawkin's Kenndy + NT   Neer's Test + NT   Anterior Apprehension test + with positive relocation NT   Sulcus Sign NT NT         Joint Mobility: R glenohumeral mild hypomobility ; mild hypermobility in L glenohumeral joint      Palpation: tenderness over biceps tendon and pec minor/major laterally on right arm              Limitation/Restriction for FOTO shoulder Survey     Therapist reviewed FOTO scores for Merry Terrazas on 4/5/2023.   FOTO documents entered into [facility] - see Media section.     Limitation Score: 34%              TREATMENT      Merry received the treatments listed below:       therapeutic exercises to develop strength, endurance, ROM, posture, and core stabilization for 45 minutes including:    UBE 3' fwd/bwd - DC  Towel roll 2" vertical x 2 min  Towel roll 2" horizontal x 2 min at T 4 level  Scap squeezes 30x3"  Seated bilateral shoulder external rotation with yellow theraband 3x10  Supine IR/ER with eccentric IR 3x10 - pain with activity  Sidelying ER 2x10 R - pain with activity  Sidelying abduction (approx  degrees) 2x10 R  Sidelying flexion 2x10 R  Seated external rotation PROM with dowel 30x3''     Supine bilateral horizontal abduction YTB 2 x 15  Supine serratus punches with 3# 3x8 R with CGA  standing elbow flexion supinated/neutral/pronated x10 each - pain with activity  Sidelying AAROM shoulder flexion with dowel 2x10  Quadruped rock backs with serratus activation 2x10  Seated upper trunk rotation 15x3'' B  Seated thoracic extension over half bolster x 20 hold 3 secs  Shoulder PNF 1/2 red TB 2x10 ea  Shoulder rows with green theraband 2 x 15  Shoulder extension with green theraband 2 x 15  Shoulder internal rotation with yellow theraband 2x10  Shoulder external rotation with yellow theraband 2x10 - unable to perform  Serratus slides with bolster at wall 2x10     manual therapy techniques: Joint mobilizations and Soft tissue Mobilization were applied " for 0 minutes, including:  STM to levator and teres minor  STM to pec and bicep  Posterior and inferior glenohumeral glides   Long axis glenohumeral distraction  PROM within tolerable range of right glenohumeral joint     neuromuscular re-education activities to improve: Coordination, Proprioception, and Posture for 0 minutes. The following activities were included:    PATIENT EDUCATION AND HOME EXERCISES     Home Exercises Provided and Patient Education Provided     Education provided:   - education on condition     Written Home Exercises Provided: not provided today. Exercises were reviewed and Merry was able to demonstrate them prior to the end of the session.  Merry demonstrated good  understanding of the education provided. See EMR under Patient Instructions for exercises provided during therapy sessions.    ASSESSMENT     Reassessment today reveals no real change in range of motion or strength of the right shoulder with continued significant deficits of external rotation strength. Although Ms. Meng has met 8 out of 9 goals, there has been a plateau in progress since last reassessment. At this point she is functional with the right arm and is able to move with very mild pain. Due to plateau in progress, we are discharge to [facility] today with referral back to referring physician.     Merry Is progressing towards her goals.   Pt prognosis is Good.     Pt will continue to benefit from skilled outpatient physical therapy to address the deficits listed in the problem list box on initial evaluation, provide pt/family education and to maximize pt's level of independence in the home and community environment.     Pt's spiritual, cultural and educational needs considered and pt agreeable to plan of care and goals.     Anticipated barriers to physical therapy: none    Goals:   Short Term Goals:  4 weeks   1.Report decreased R shoulder pain < / =  4/10  to increase tolerance for donning seatbelt MET  2. Increase PROM by  5-10 degrees where limited not assessed MET  3. Increased strength by 1/3 MMT grade in BUE to increase tolerance for ADL and work activities. MET  4. Pt to tolerate HEP to improve ROM and independence with ADL's. MET 2/16/23     Long Term Goals: 8 weeks  1.Report decreased R shoulder pain  < / =  2 /10  to increase tolerance for reaching top cabinets. MET 2/16/23  2.Increase AROM to by 5-10 degrees where limited without pain. MET 2/16/23  3.Increase strength to >/= 4/5 in BUE to increase tolerance for ADL and work activities.  4. Pt goal: Pt to be able to reach forward without increase in shoulder pain. MET 2/16/23  5. Pt will have improved gcode of CJ (20-40% limited) on FOTO shoulder in order to demonstrate true functional improvement.  MET    PLAN     Plan of care Certification: 12/8/2022 to 3/30/23.     Outpatient Physical Therapy 2 times weekly for 10 weeks to include the following interventions: Cervical/Lumbar Traction, Electrical Stimulation  , Gait Training, Manual Therapy, Moist Heat/ Ice, Neuromuscular Re-ed, Patient Education, Therapeutic Activities, Therapeutic Exercise, and Ultrasound.     Pt is discharged to home exercise program today.     Kendy Sarmiento, PT

## 2023-05-04 DIAGNOSIS — I12.9 HYPERTENSION, RENAL DISEASE, STAGE 1-4 OR UNSPECIFIED CHRONIC KIDNEY DISEASE: ICD-10-CM

## 2023-05-22 ENCOUNTER — OFFICE VISIT (OUTPATIENT)
Dept: PRIMARY CARE CLINIC | Facility: CLINIC | Age: 73
End: 2023-05-22
Payer: MEDICARE

## 2023-05-22 VITALS
SYSTOLIC BLOOD PRESSURE: 118 MMHG | OXYGEN SATURATION: 96 % | HEART RATE: 79 BPM | WEIGHT: 152.13 LBS | RESPIRATION RATE: 18 BRPM | DIASTOLIC BLOOD PRESSURE: 78 MMHG | TEMPERATURE: 98 F | HEIGHT: 60 IN | BODY MASS INDEX: 29.86 KG/M2

## 2023-05-22 DIAGNOSIS — E78.5 BORDERLINE HYPERLIPIDEMIA: ICD-10-CM

## 2023-05-22 DIAGNOSIS — F33.0 DEPRESSION, MAJOR, RECURRENT, MILD: Primary | ICD-10-CM

## 2023-05-22 DIAGNOSIS — I77.9 LEFT-SIDED CAROTID ARTERY DISEASE, UNSPECIFIED TYPE: ICD-10-CM

## 2023-05-22 DIAGNOSIS — I12.9 HYPERTENSION, RENAL DISEASE, STAGE 1-4 OR UNSPECIFIED CHRONIC KIDNEY DISEASE: ICD-10-CM

## 2023-05-22 PROCEDURE — 99214 PR OFFICE/OUTPT VISIT, EST, LEVL IV, 30-39 MIN: ICD-10-PCS | Mod: S$GLB,,, | Performed by: INTERNAL MEDICINE

## 2023-05-22 PROCEDURE — 1160F PR REVIEW ALL MEDS BY PRESCRIBER/CLIN PHARMACIST DOCUMENTED: ICD-10-PCS | Mod: CPTII,S$GLB,, | Performed by: INTERNAL MEDICINE

## 2023-05-22 PROCEDURE — 99999 PR PBB SHADOW E&M-EST. PATIENT-LVL IV: CPT | Mod: PBBFAC,,, | Performed by: INTERNAL MEDICINE

## 2023-05-22 PROCEDURE — 3074F SYST BP LT 130 MM HG: CPT | Mod: CPTII,S$GLB,, | Performed by: INTERNAL MEDICINE

## 2023-05-22 PROCEDURE — 3078F DIAST BP <80 MM HG: CPT | Mod: CPTII,S$GLB,, | Performed by: INTERNAL MEDICINE

## 2023-05-22 PROCEDURE — 1159F PR MEDICATION LIST DOCUMENTED IN MEDICAL RECORD: ICD-10-PCS | Mod: CPTII,S$GLB,, | Performed by: INTERNAL MEDICINE

## 2023-05-22 PROCEDURE — 1160F RVW MEDS BY RX/DR IN RCRD: CPT | Mod: CPTII,S$GLB,, | Performed by: INTERNAL MEDICINE

## 2023-05-22 PROCEDURE — 1126F AMNT PAIN NOTED NONE PRSNT: CPT | Mod: CPTII,S$GLB,, | Performed by: INTERNAL MEDICINE

## 2023-05-22 PROCEDURE — 1101F PT FALLS ASSESS-DOCD LE1/YR: CPT | Mod: CPTII,S$GLB,, | Performed by: INTERNAL MEDICINE

## 2023-05-22 PROCEDURE — 3008F BODY MASS INDEX DOCD: CPT | Mod: CPTII,S$GLB,, | Performed by: INTERNAL MEDICINE

## 2023-05-22 PROCEDURE — 99214 OFFICE O/P EST MOD 30 MIN: CPT | Performed by: INTERNAL MEDICINE

## 2023-05-22 PROCEDURE — 3288F FALL RISK ASSESSMENT DOCD: CPT | Mod: CPTII,S$GLB,, | Performed by: INTERNAL MEDICINE

## 2023-05-22 PROCEDURE — 99214 OFFICE O/P EST MOD 30 MIN: CPT | Mod: S$GLB,,, | Performed by: INTERNAL MEDICINE

## 2023-05-22 PROCEDURE — 1101F PR PT FALLS ASSESS DOC 0-1 FALLS W/OUT INJ PAST YR: ICD-10-PCS | Mod: CPTII,S$GLB,, | Performed by: INTERNAL MEDICINE

## 2023-05-22 PROCEDURE — 3074F PR MOST RECENT SYSTOLIC BLOOD PRESSURE < 130 MM HG: ICD-10-PCS | Mod: CPTII,S$GLB,, | Performed by: INTERNAL MEDICINE

## 2023-05-22 PROCEDURE — 99999 PR PBB SHADOW E&M-EST. PATIENT-LVL IV: ICD-10-PCS | Mod: PBBFAC,,, | Performed by: INTERNAL MEDICINE

## 2023-05-22 PROCEDURE — 3008F PR BODY MASS INDEX (BMI) DOCUMENTED: ICD-10-PCS | Mod: CPTII,S$GLB,, | Performed by: INTERNAL MEDICINE

## 2023-05-22 PROCEDURE — 1159F MED LIST DOCD IN RCRD: CPT | Mod: CPTII,S$GLB,, | Performed by: INTERNAL MEDICINE

## 2023-05-22 PROCEDURE — 3078F PR MOST RECENT DIASTOLIC BLOOD PRESSURE < 80 MM HG: ICD-10-PCS | Mod: CPTII,S$GLB,, | Performed by: INTERNAL MEDICINE

## 2023-05-22 PROCEDURE — 3288F PR FALLS RISK ASSESSMENT DOCUMENTED: ICD-10-PCS | Mod: CPTII,S$GLB,, | Performed by: INTERNAL MEDICINE

## 2023-05-22 PROCEDURE — 1126F PR PAIN SEVERITY QUANTIFIED, NO PAIN PRESENT: ICD-10-PCS | Mod: CPTII,S$GLB,, | Performed by: INTERNAL MEDICINE

## 2023-05-22 NOTE — PROGRESS NOTES
[facility] Note    Chief Complaint      Chief Complaint   Patient presents with    Follow-up     6m        History of Present Illness      Merry Terrazas is a 73 y.o. female who presents today for   Chief Complaint   Patient presents with    Follow-up     6m    .  Patient comes to appointment here for 6m checkup for chronic issues as below . She is feeling well is under some stress with chronically ill spouse . She is otherwise doing well is compliant with all meds . She sicne alst visit has had carotid endarterectomy with dr Condon on left . She melissa repeat u/s scheduled for  . All labs from then reviewed .     Problem List Items Addressed This Visit          Psychiatric    Depression, major, recurrent, mild - Primary    Overview     celexa working well back on 20 mg and is doing well               Cardiac/Vascular    Borderline hyperlipidemia    Overview     Cont current regimen currently diet only           Left-sided carotid artery disease    Overview     S/p cea with Dr watters ct surg .               Renal/    Hypertension, renal disease, stage 1-4 or unspecified chronic kidney disease    Overview      losartan hctz 100/12.5 tolerating well and blood pressure well controlled                 Past Medical History:  Past Medical History:   Diagnosis Date    Anxiety     GERD (gastroesophageal reflux disease)     Glaucoma     Hypertension        Past Surgical History:  Past Surgical History:   Procedure Laterality Date    BOWEL RESECTION      polyp benign     SECTION      COLON SURGERY      colonoscopy  2021    OPEN REDUCTION AND INTERNAL FIXATION (ORIF) OF INJURY OF ELBOW Left 2021    Procedure: ORIF, ELBOW;  Surgeon: Claude S. Williams IV, MD;  Location: [facility];  Service: Orthopedics;  Laterality: Left;       Family History:  family history includes Hypertension in her maternal grandmother and mother; Stroke in her maternal grandmother and  mother.    Social History:  Social History     Socioeconomic History    Marital status:    Tobacco Use    Smoking status: Never    Smokeless tobacco: Never   Substance and Sexual Activity    Alcohol use: Yes     Alcohol/week: 6.0 standard drinks     Types: 6 Glasses of wine per week     Comment: wine most days    Drug use: Never    Sexual activity: Yes     Partners: Male     Birth control/protection: Post-menopausal       Review of Systems:   Review of Systems   Constitutional:  Negative for fever and weight loss.   HENT:  Negative for congestion, hearing loss and sore throat.    Eyes:  Negative for blurred vision.   Respiratory:  Negative for cough and shortness of breath.    Cardiovascular:  Negative for chest pain, palpitations, claudication and leg swelling.   Gastrointestinal:  Negative for abdominal pain, constipation, diarrhea and heartburn.   Genitourinary:  Negative for dysuria.   Musculoskeletal:  Negative for back pain and myalgias.   Skin:  Negative for rash.   Neurological:  Negative for focal weakness and headaches.   Psychiatric/Behavioral:  Negative for depression and suicidal ideas.        Medications:  Outpatient Encounter Medications as of 5/22/2023   Medication Sig Dispense Refill    albuterol (VENTOLIN HFA) 90 mcg/actuation inhaler Inhale 2 puffs into the lungs every 6 (six) hours as needed for Wheezing. Rescue. Ok to substitute based on availability: Proair, Proventil, ventolin 18 g 0    amLODIPine (NORVASC) 5 MG tablet TAKE 1 TABLET (5 MG TOTAL) BY MOUTH ONCE DAILY. 90 tablet 3    aspirin (ASPIR-81 ORAL) Take by mouth.      atorvastatin (LIPITOR) 80 MG tablet Take 1 tablet (80 mg total) by mouth once daily. 90 tablet 3    cetirizine (ZYRTEC) 10 MG tablet Take 1 tablet (10 mg total) by mouth once daily. 30 tablet 0    citalopram (CELEXA) 20 MG tablet TAKE ONE AND ONE-HALF BY MOUTH DAILY 135 tablet 3    Covid-19 vaccine, AD25.COV2.S (AYANA, COVID-19,) 0.5 mL injection Inject into the  muscle. 0.5 mL 0    esomeprazole (NEXIUM) 20 MG capsule Take 20 mg by mouth as needed.       latanoprost 0.005 % ophthalmic solution   0    losartan-hydrochlorothiazide 100-25 mg (HYZAAR) 100-25 mg per tablet TAKE 1 TABLET BY MOUTH ONCE DAILY. 90 tablet 3    vit C/vit E acet/lutein/min (OCUVITE LUTEIN ORAL) Take by mouth.      vitamin D (VITAMIN D3) 1000 units Tab Take 1,000 Units by mouth once daily.      [] fluticasone propionate (FLONASE) 50 mcg/actuation nasal spray 1 spray (50 mcg total) by Each Nostril route once daily. 9.9 mL 0     No facility-administered encounter medications on file as of 2023.        Allergies:  Review of patient's allergies indicates:   Allergen Reactions    Ace inhibitors Swelling         Physical Exam         Vitals:    23 0808   BP: 118/78   Pulse: 79   Resp: 18   Temp: 98 °F (36.7 °C)         Physical Exam  Constitutional:       Appearance: She is well-developed.   Eyes:      Pupils: Pupils are equal, round, and reactive to light.   Neck:      Thyroid: No thyromegaly.   Cardiovascular:      Rate and Rhythm: Normal rate.      Heart sounds: Normal heart sounds. No murmur heard.    No friction rub. No gallop.   Pulmonary:      Breath sounds: Normal breath sounds.   Abdominal:      General: Bowel sounds are normal.      Palpations: Abdomen is soft.   Musculoskeletal:         General: Normal range of motion.      Cervical back: Normal range of motion.   Lymphadenopathy:      Cervical: No cervical adenopathy.   Skin:     General: Skin is warm.      Findings: No rash.   Neurological:      Mental Status: She is alert and oriented to person, place, and time.      Cranial Nerves: No cranial nerve deficit.   Psychiatric:         Behavior: Behavior normal.        Laboratory:  CBC:  No results for input(s): WBC, RBC, HGB, HCT, PLT, MCV, MCH, MCHC in the last 2160 hours.  CMP:  No results for input(s): GLU, CALCIUM, ALBUMIN, PROT, NA, K, CO2, CL, BUN, ALKPHOS, ALT, AST, BILITOT  in the last 2160 hours.    Invalid input(s): CREATININ  URINALYSIS:  No results for input(s): COLORU, CLARITYU, SPECGRAV, PHUR, PROTEINUA, GLUCOSEU, BILIRUBINCON, BLOODU, WBCU, RBCU, BACTERIA, MUCUS, NITRITE, LEUKOCYTESUR, UROBILINOGEN, HYALINECASTS in the last 2160 hours.   LIPIDS:  No results for input(s): TSH, HDL, CHOL, TRIG, LDLCALC, CHOLHDL, NONHDLCHOL, TOTALCHOLEST in the last 2160 hours.  TSH:  No results for input(s): TSH in the last 2160 hours.  A1C:  No results for input(s): HGBA1C in the last 2160 hours.    Radiology:        Assessment:     Merry Terrazas is a 73 y.o.female with:    Depression, major, recurrent, mild    Hypertension, renal disease, stage 1-4 or unspecified chronic kidney disease    Borderline hyperlipidemia    Left-sided carotid artery disease, unspecified type          Plan:     Problem List Items Addressed This Visit          Psychiatric    Depression, major, recurrent, mild - Primary    Overview     celexa working well back on 20 mg and is doing well               Cardiac/Vascular    Borderline hyperlipidemia    Overview     Cont current regimen currently diet only           Left-sided carotid artery disease    Overview     S/p cea with Dr watters ct surg .               Renal/    Hypertension, renal disease, stage 1-4 or unspecified chronic kidney disease    Overview      losartan hctz 100/12.5 tolerating well and blood pressure well controlled               As above, continue current medications and maintain follow up with specialists.  Return to clinic in 6 months.  '  Frederick W Dantagnan [facility]

## 2023-10-12 RX ORDER — ATORVASTATIN CALCIUM 80 MG/1
80 TABLET, FILM COATED ORAL DAILY
Qty: 90 TABLET | Refills: 3 | Status: SHIPPED | OUTPATIENT
Start: 2023-10-12

## 2023-10-12 RX ORDER — AMLODIPINE BESYLATE 5 MG/1
5 TABLET ORAL DAILY
Qty: 90 TABLET | Refills: 2 | Status: SHIPPED | OUTPATIENT
Start: 2023-10-12

## 2023-10-12 NOTE — TELEPHONE ENCOUNTER
Care Due:                  Date            Visit Type   Department     Provider  --------------------------------------------------------------------------------                                EP -                              PRIMARY      [facility]  Last Visit: 05-      CARE ([facility])   AKIN Campos                              EP -                              PRIMARY      [facility]  Next Visit: 12-      CARE ([facility])   AKIN Campos                                                            Last  Test          Frequency    Reason                     Performed    Due Date  --------------------------------------------------------------------------------    CMP.........  12 months..  atorvastatin,              03- 02-                             losartan-hydrochlorothiaz                             humberto......................    Lipid Panel.  12 months..  atorvastatin.............  03- 02-    [facility] Embedded Care Due Messages. Reference number: [id number].   10/12/2023 7:53:35 AM CDT

## 2023-10-12 NOTE — TELEPHONE ENCOUNTER
Refill Routing Note   Medication(s) are not appropriate for processing by [facility] for the following reason(s):      Required labs outdated    [facility] action(s):  Approve  Defer Care Due:  Labs due            Appointments  past 12m or future 3m with PCP    Date Provider   Last Visit   5/22/2023 Randy Campos MD   Next Visit   12/11/2023 Randy Campos MD   ED visits in past 90 days: 0        Note composed:11:32 AM 10/12/2023

## 2023-12-11 ENCOUNTER — OFFICE VISIT (OUTPATIENT)
Dept: PRIMARY CARE CLINIC | Facility: CLINIC | Age: 73
End: 2023-12-11
Payer: MEDICARE

## 2023-12-11 ENCOUNTER — TELEPHONE (OUTPATIENT)
Dept: PRIMARY CARE CLINIC | Facility: CLINIC | Age: 73
End: 2023-12-11

## 2023-12-11 VITALS
WEIGHT: 158.5 LBS | BODY MASS INDEX: 31.12 KG/M2 | RESPIRATION RATE: 18 BRPM | HEIGHT: 60 IN | TEMPERATURE: 98 F | HEART RATE: 91 BPM | DIASTOLIC BLOOD PRESSURE: 78 MMHG | OXYGEN SATURATION: 96 % | SYSTOLIC BLOOD PRESSURE: 130 MMHG

## 2023-12-11 DIAGNOSIS — Z12.31 SCREENING MAMMOGRAM, ENCOUNTER FOR: ICD-10-CM

## 2023-12-11 DIAGNOSIS — F33.0 DEPRESSION, MAJOR, RECURRENT, MILD: ICD-10-CM

## 2023-12-11 DIAGNOSIS — I12.9 HYPERTENSION, RENAL DISEASE, STAGE 1-4 OR UNSPECIFIED CHRONIC KIDNEY DISEASE: ICD-10-CM

## 2023-12-11 DIAGNOSIS — I77.9 LEFT-SIDED CAROTID ARTERY DISEASE, UNSPECIFIED TYPE: ICD-10-CM

## 2023-12-11 DIAGNOSIS — E78.5 BORDERLINE HYPERLIPIDEMIA: Primary | ICD-10-CM

## 2023-12-11 PROCEDURE — 1126F PR PAIN SEVERITY QUANTIFIED, NO PAIN PRESENT: ICD-10-PCS | Mod: CPTII,S$GLB,, | Performed by: INTERNAL MEDICINE

## 2023-12-11 PROCEDURE — 1101F PR PT FALLS ASSESS DOC 0-1 FALLS W/OUT INJ PAST YR: ICD-10-PCS | Mod: CPTII,S$GLB,, | Performed by: INTERNAL MEDICINE

## 2023-12-11 PROCEDURE — 99999 PR PBB SHADOW E&M-EST. PATIENT-LVL IV: ICD-10-PCS | Mod: PBBFAC,,, | Performed by: INTERNAL MEDICINE

## 2023-12-11 PROCEDURE — 1101F PT FALLS ASSESS-DOCD LE1/YR: CPT | Mod: CPTII,S$GLB,, | Performed by: INTERNAL MEDICINE

## 2023-12-11 PROCEDURE — 3078F DIAST BP <80 MM HG: CPT | Mod: CPTII,S$GLB,, | Performed by: INTERNAL MEDICINE

## 2023-12-11 PROCEDURE — 99999 PR PBB SHADOW E&M-EST. PATIENT-LVL IV: CPT | Mod: PBBFAC,,, | Performed by: INTERNAL MEDICINE

## 2023-12-11 PROCEDURE — 3288F PR FALLS RISK ASSESSMENT DOCUMENTED: ICD-10-PCS | Mod: CPTII,S$GLB,, | Performed by: INTERNAL MEDICINE

## 2023-12-11 PROCEDURE — 3078F PR MOST RECENT DIASTOLIC BLOOD PRESSURE < 80 MM HG: ICD-10-PCS | Mod: CPTII,S$GLB,, | Performed by: INTERNAL MEDICINE

## 2023-12-11 PROCEDURE — 1160F PR REVIEW ALL MEDS BY PRESCRIBER/CLIN PHARMACIST DOCUMENTED: ICD-10-PCS | Mod: CPTII,S$GLB,, | Performed by: INTERNAL MEDICINE

## 2023-12-11 PROCEDURE — 3075F PR MOST RECENT SYSTOLIC BLOOD PRESS GE 130-139MM HG: ICD-10-PCS | Mod: CPTII,S$GLB,, | Performed by: INTERNAL MEDICINE

## 2023-12-11 PROCEDURE — 99214 OFFICE O/P EST MOD 30 MIN: CPT | Mod: S$GLB,,, | Performed by: INTERNAL MEDICINE

## 2023-12-11 PROCEDURE — 3008F BODY MASS INDEX DOCD: CPT | Mod: CPTII,S$GLB,, | Performed by: INTERNAL MEDICINE

## 2023-12-11 PROCEDURE — 99214 PR OFFICE/OUTPT VISIT, EST, LEVL IV, 30-39 MIN: ICD-10-PCS | Mod: S$GLB,,, | Performed by: INTERNAL MEDICINE

## 2023-12-11 PROCEDURE — 1160F RVW MEDS BY RX/DR IN RCRD: CPT | Mod: CPTII,S$GLB,, | Performed by: INTERNAL MEDICINE

## 2023-12-11 PROCEDURE — 3288F FALL RISK ASSESSMENT DOCD: CPT | Mod: CPTII,S$GLB,, | Performed by: INTERNAL MEDICINE

## 2023-12-11 PROCEDURE — 1159F PR MEDICATION LIST DOCUMENTED IN MEDICAL RECORD: ICD-10-PCS | Mod: CPTII,S$GLB,, | Performed by: INTERNAL MEDICINE

## 2023-12-11 PROCEDURE — 3075F SYST BP GE 130 - 139MM HG: CPT | Mod: CPTII,S$GLB,, | Performed by: INTERNAL MEDICINE

## 2023-12-11 PROCEDURE — 3008F PR BODY MASS INDEX (BMI) DOCUMENTED: ICD-10-PCS | Mod: CPTII,S$GLB,, | Performed by: INTERNAL MEDICINE

## 2023-12-11 PROCEDURE — 1159F MED LIST DOCD IN RCRD: CPT | Mod: CPTII,S$GLB,, | Performed by: INTERNAL MEDICINE

## 2023-12-11 PROCEDURE — 1126F AMNT PAIN NOTED NONE PRSNT: CPT | Mod: CPTII,S$GLB,, | Performed by: INTERNAL MEDICINE

## 2023-12-11 NOTE — PROGRESS NOTES
[facility] Note    Chief Complaint      Chief Complaint   Patient presents with    Follow-up     6m       History of Present Illness      Merry Terrazas is a 73 y.o. female who presents today for   Chief Complaint   Patient presents with    Follow-up     6m   .  Patient comes to appointment here for 6m checkup for chronic issues as below . She is feeling well is grieving appropriately the recent loss  of her  . She is stable on her current regimen .  Will be seeing dr watters this week for surveillance of her carotid stenosis     Problem List Items Addressed This Visit          Psychiatric    Depression, major, recurrent, mild    Overview     celexa working well back on 20 mg and is doing well             Cardiac/Vascular    Borderline hyperlipidemia - Primary    Overview     Cont current regimen currently diet only         Left-sided carotid artery disease    Overview     S/p cea with Dr watters ct surg . Will be getting repeat u/s tomorrow and visit this week             Renal/    Screening mammogram, encounter for    Overview     Ordered          Hypertension, renal disease, stage 1-4 or unspecified chronic kidney disease    Overview      losartan hctz 100/12.5 tolerating well and blood pressure well controlled               Past Medical History:  Past Medical History:   Diagnosis Date    Anxiety     GERD (gastroesophageal reflux disease)     Glaucoma     Hypertension        Past Surgical History:  Past Surgical History:   Procedure Laterality Date    BOWEL RESECTION      polyp benign     SECTION      COLON SURGERY      colonoscopy  2021    OPEN REDUCTION AND INTERNAL FIXATION (ORIF) OF INJURY OF ELBOW Left 2021    Procedure: ORIF, ELBOW;  Surgeon: Claude S. Williams IV, MD;  Location: [facility];  Service: Orthopedics;  Laterality: Left;       Family History:  family history includes Hypertension in her maternal grandmother and mother; Stroke in her  maternal grandmother and mother.    Social History:  Social History     Socioeconomic History    Marital status:    Tobacco Use    Smoking status: Never    Smokeless tobacco: Never   Substance and Sexual Activity    Alcohol use: Yes     Alcohol/week: 6.0 standard drinks of alcohol     Types: 6 Glasses of wine per week     Comment: wine most days    Drug use: Never    Sexual activity: Yes     Partners: Male     Birth control/protection: Post-menopausal     Social Determinants of Health     Financial Resource Strain: Low Risk  (12/3/2019)    Overall Financial Resource Strain (CARDIA)     Difficulty of Paying Living Expenses: Not hard at all   Food Insecurity: No Food Insecurity (12/3/2019)    Hunger Vital Sign     Worried About Running Out of Food in the Last Year: Never true     Ran Out of Food in the Last Year: Never true   Transportation Needs: No Transportation Needs (12/3/2019)    PRAPARE - Transportation     Lack of Transportation (Medical): No     Lack of Transportation (Non-Medical): No   Physical Activity: Sufficiently Active (12/3/2019)    Exercise Vital Sign     Days of Exercise per Week: 5 days     Minutes of Exercise per Session: 40 min   Stress: No Stress Concern Present (12/3/2019)    [employer] [place] [employer] - Occupational Stress Questionnaire     Feeling of Stress : Not at all   Social Connections: Moderately Isolated (12/3/2019)    Social Connection and Isolation Panel [NHANES]     Frequency of Communication with Friends and Family: More than three times a week     Frequency of Social Gatherings with Friends and Family: More than three times a week     Attends Zoroastrian Services: Never     Active Member of Clubs or Organizations: No     Attends Club or Organization Meetings: Never     Marital Status:        Review of Systems:   Review of Systems   Constitutional:  Negative for fever and weight loss.   HENT:  Negative for congestion, hearing loss and sore throat.    Eyes:   Negative for blurred vision.   Respiratory:  Negative for cough and shortness of breath.    Cardiovascular:  Negative for chest pain, palpitations, claudication and leg swelling.   Gastrointestinal:  Negative for abdominal pain, constipation, diarrhea and heartburn.   Genitourinary:  Negative for dysuria.   Musculoskeletal:  Negative for back pain and myalgias.   Skin:  Negative for rash.   Neurological:  Negative for focal weakness and headaches.   Psychiatric/Behavioral:  Negative for depression and suicidal ideas. The patient is not nervous/anxious.          Medications:  Outpatient Encounter Medications as of 12/11/2023   Medication Sig Dispense Refill    albuterol (VENTOLIN HFA) 90 mcg/actuation inhaler Inhale 2 puffs into the lungs every 6 (six) hours as needed for Wheezing. Rescue. Ok to substitute based on availability: Proair, Proventil, ventolin 18 g 0    amLODIPine (NORVASC) 5 MG tablet TAKE 1 TABLET (5 MG TOTAL) BY MOUTH ONCE DAILY. 90 tablet 2    aspirin (ASPIR-81 ORAL) Take by mouth.      atorvastatin (LIPITOR) 80 MG tablet TAKE 1 TABLET (80 MG TOTAL) BY MOUTH ONCE DAILY. 90 tablet 3    citalopram (CELEXA) 20 MG tablet TAKE ONE AND ONE-HALF BY MOUTH DAILY 135 tablet 3    Covid-19 vaccine, AD25.COV2.S (AYANA, COVID-19,) 0.5 mL injection Inject into the muscle. 0.5 mL 0    esomeprazole (NEXIUM) 20 MG capsule Take 20 mg by mouth as needed.       latanoprost 0.005 % ophthalmic solution   0    losartan-hydrochlorothiazide 100-25 mg (HYZAAR) 100-25 mg per tablet TAKE 1 TABLET BY MOUTH ONCE DAILY. 90 tablet 3    vit C/vit E acet/lutein/min (OCUVITE LUTEIN ORAL) Take by mouth.      vitamin D (VITAMIN D3) 1000 units Tab Take 1,000 Units by mouth once daily.      cetirizine (ZYRTEC) 10 MG tablet Take 1 tablet (10 mg total) by mouth once daily. 30 tablet 0     No facility-administered encounter medications on file as of 12/11/2023.        Allergies:  Review of patient's allergies indicates:   Allergen  "Reactions    Ace inhibitors Swelling         Physical Exam         Vitals:    12/11/23 0845   BP: 130/78   Pulse: 91   Resp: 18   Temp: 98 °F (36.7 °C)         Physical Exam  Constitutional:       Appearance: She is well-developed.   Eyes:      Pupils: Pupils are equal, round, and reactive to light.   Neck:      Thyroid: No thyromegaly.   Cardiovascular:      Rate and Rhythm: Normal rate.      Heart sounds: Normal heart sounds. No murmur heard.     No friction rub. No gallop.   Pulmonary:      Breath sounds: Normal breath sounds.   Abdominal:      General: Bowel sounds are normal.      Palpations: Abdomen is soft.   Musculoskeletal:         General: Normal range of motion.      Cervical back: Normal range of motion.   Lymphadenopathy:      Cervical: No cervical adenopathy.   Skin:     General: Skin is warm.      Findings: No rash.   Neurological:      Mental Status: She is alert and oriented to person, place, and time.      Cranial Nerves: No cranial nerve deficit.   Psychiatric:         Behavior: Behavior normal.          Laboratory:  CBC:  No results for input(s): "WBC", "RBC", "HGB", "HCT", "PLT", "MCV", "MCH", "MCHC" in the last 2160 hours.  CMP:  No results for input(s): "GLU", "CALCIUM", "ALBUMIN", "PROT", "NA", "K", "CO2", "CL", "BUN", "ALKPHOS", "ALT", "AST", "BILITOT" in the last 2160 hours.    Invalid input(s): "CREATININ"  URINALYSIS:  No results for input(s): "COLORU", "CLARITYU", "SPECGRAV", "PHUR", "PROTEINUA", "GLUCOSEU", "BILIRUBINCON", "BLOODU", "WBCU", "RBCU", "BACTERIA", "MUCUS", "NITRITE", "LEUKOCYTESUR", "UROBILINOGEN", "HYALINECASTS" in the last 2160 hours.   LIPIDS:  No results for input(s): "TSH", "HDL", "CHOL", "TRIG", "LDLCALC", "CHOLHDL", "NONHDLCHOL", "TOTALCHOLEST" in the last 2160 hours.  TSH:  No results for input(s): "TSH" in the last 2160 hours.  A1C:  No results for input(s): "HGBA1C" in the last 2160 hours.    Radiology:        Assessment:     Merry Terrazas is a 73 y.o.female " with:    Borderline hyperlipidemia  -     Comprehensive Metabolic Panel; Future; Expected date: 12/11/2023  -     Lipid Panel; Future; Expected date: 12/11/2023    Depression, major, recurrent, mild    Left-sided carotid artery disease, unspecified type    Hypertension, renal disease, stage 1-4 or unspecified chronic kidney disease  -     CBC Auto Differential; Future; Expected date: 12/11/2023    Screening mammogram, encounter for  -     Mammo Digital Screening Bilat w/ Kranthi; Future; Expected date: 12/11/2023          Plan:     Problem List Items Addressed This Visit          Psychiatric    Depression, major, recurrent, mild    Overview     celexa working well back on 20 mg and is doing well             Cardiac/Vascular    Borderline hyperlipidemia - Primary    Overview     Cont current regimen currently diet only         Left-sided carotid artery disease    Overview     S/p cea with Dr watters ct surg . Will be getting repeat u/s tomorrow and visit this week             Renal/    Screening mammogram, encounter for    Overview     Ordered          Hypertension, renal disease, stage 1-4 or unspecified chronic kidney disease    Overview      losartan hctz 100/12.5 tolerating well and blood pressure well controlled             As above, continue current medications and maintain follow up with specialists.  Return to clinic in 6 months.      Frederick W Dantagnan [facility]

## 2023-12-14 ENCOUNTER — LAB VISIT (OUTPATIENT)
Dept: LAB | Facility: HOSPITAL | Age: 73
End: 2023-12-14
Attending: INTERNAL MEDICINE
Payer: MEDICARE

## 2023-12-14 DIAGNOSIS — E78.5 BORDERLINE HYPERLIPIDEMIA: ICD-10-CM

## 2023-12-14 DIAGNOSIS — I12.9 HYPERTENSION, RENAL DISEASE, STAGE 1-4 OR UNSPECIFIED CHRONIC KIDNEY DISEASE: ICD-10-CM

## 2023-12-14 LAB
ALBUMIN SERPL BCP-MCNC: 3.6 G/DL (ref 3.5–5.2)
ALP SERPL-CCNC: 57 U/L (ref 55–135)
ALT SERPL W/O P-5'-P-CCNC: 25 U/L (ref 10–44)
ANION GAP SERPL CALC-SCNC: 10 MMOL/L (ref 8–16)
AST SERPL-CCNC: 24 U/L (ref 10–40)
BASOPHILS # BLD AUTO: 0.03 K/UL (ref 0–0.2)
BASOPHILS NFR BLD: 0.5 % (ref 0–1.9)
BILIRUB SERPL-MCNC: 0.4 MG/DL (ref 0.1–1)
BUN SERPL-MCNC: 55 MG/DL (ref 8–23)
CALCIUM SERPL-MCNC: 10.1 MG/DL (ref 8.7–10.5)
CHLORIDE SERPL-SCNC: 106 MMOL/L (ref 95–110)
CHOLEST SERPL-MCNC: 177 MG/DL (ref 120–199)
CHOLEST/HDLC SERPL: 2.9 {RATIO} (ref 2–5)
CO2 SERPL-SCNC: 23 MMOL/L (ref 23–29)
CREAT SERPL-MCNC: 1.5 MG/DL (ref 0.5–1.4)
DIFFERENTIAL METHOD: ABNORMAL
EOSINOPHIL # BLD AUTO: 0.2 K/UL (ref 0–0.5)
EOSINOPHIL NFR BLD: 3.6 % (ref 0–8)
ERYTHROCYTE [DISTWIDTH] IN BLOOD BY AUTOMATED COUNT: 13.2 % (ref 11.5–14.5)
EST. GFR  (NO RACE VARIABLE): 37 ML/MIN/1.73 M^2
GLUCOSE SERPL-MCNC: 97 MG/DL (ref 70–110)
HCT VFR BLD AUTO: 43.7 % (ref 37–48.5)
HDLC SERPL-MCNC: 61 MG/DL (ref 40–75)
HDLC SERPL: 34.5 % (ref 20–50)
HGB BLD-MCNC: 14.1 G/DL (ref 12–16)
IMM GRANULOCYTES # BLD AUTO: 0.03 K/UL (ref 0–0.04)
IMM GRANULOCYTES NFR BLD AUTO: 0.5 % (ref 0–0.5)
LDLC SERPL CALC-MCNC: 88.6 MG/DL (ref 63–159)
LYMPHOCYTES # BLD AUTO: 2.3 K/UL (ref 1–4.8)
LYMPHOCYTES NFR BLD: 37.7 % (ref 18–48)
MCH RBC QN AUTO: 31.8 PG (ref 27–31)
MCHC RBC AUTO-ENTMCNC: 32.3 G/DL (ref 32–36)
MCV RBC AUTO: 99 FL (ref 82–98)
MONOCYTES # BLD AUTO: 0.5 K/UL (ref 0.3–1)
MONOCYTES NFR BLD: 8.7 % (ref 4–15)
NEUTROPHILS # BLD AUTO: 3 K/UL (ref 1.8–7.7)
NEUTROPHILS NFR BLD: 49 % (ref 38–73)
NONHDLC SERPL-MCNC: 116 MG/DL
NRBC BLD-RTO: 0 /100 WBC
PLATELET # BLD AUTO: 292 K/UL (ref 150–450)
PMV BLD AUTO: 10.1 FL (ref 9.2–12.9)
POTASSIUM SERPL-SCNC: 4.2 MMOL/L (ref 3.5–5.1)
PROT SERPL-MCNC: 7.4 G/DL (ref 6–8.4)
RBC # BLD AUTO: 4.43 M/UL (ref 4–5.4)
SODIUM SERPL-SCNC: 139 MMOL/L (ref 136–145)
TRIGL SERPL-MCNC: 137 MG/DL (ref 30–150)
WBC # BLD AUTO: 6.18 K/UL (ref 3.9–12.7)

## 2023-12-14 PROCEDURE — 80053 COMPREHEN METABOLIC PANEL: CPT | Mod: HCNC | Performed by: INTERNAL MEDICINE

## 2023-12-14 PROCEDURE — 80061 LIPID PANEL: CPT | Mod: HCNC | Performed by: INTERNAL MEDICINE

## 2023-12-14 PROCEDURE — 36415 COLL VENOUS BLD VENIPUNCTURE: CPT | Performed by: INTERNAL MEDICINE

## 2023-12-14 PROCEDURE — 85025 COMPLETE CBC W/AUTO DIFF WBC: CPT | Mod: HCNC | Performed by: INTERNAL MEDICINE

## 2023-12-19 NOTE — PROGRESS NOTES
Labs ok renal function a little lower than last check . Lets repeat bmp but make sure she drinks plenty of water prior to lasb

## 2024-02-01 ENCOUNTER — PATIENT MESSAGE (OUTPATIENT)
Dept: PRIMARY CARE CLINIC | Facility: CLINIC | Age: 74
End: 2024-02-01
Payer: MEDICARE

## 2024-02-01 DIAGNOSIS — I12.9 HYPERTENSION, RENAL DISEASE, STAGE 1-4 OR UNSPECIFIED CHRONIC KIDNEY DISEASE: Primary | ICD-10-CM

## 2024-02-07 ENCOUNTER — LAB VISIT (OUTPATIENT)
Dept: LAB | Facility: HOSPITAL | Age: 74
End: 2024-02-07
Attending: INTERNAL MEDICINE
Payer: MEDICARE

## 2024-02-07 DIAGNOSIS — I12.9 HYPERTENSION, RENAL DISEASE, STAGE 1-4 OR UNSPECIFIED CHRONIC KIDNEY DISEASE: ICD-10-CM

## 2024-02-07 LAB
ANION GAP SERPL CALC-SCNC: 13 MMOL/L (ref 8–16)
BUN SERPL-MCNC: 41 MG/DL (ref 8–23)
CALCIUM SERPL-MCNC: 10.4 MG/DL (ref 8.7–10.5)
CHLORIDE SERPL-SCNC: 103 MMOL/L (ref 95–110)
CO2 SERPL-SCNC: 21 MMOL/L (ref 23–29)
CREAT SERPL-MCNC: 1.5 MG/DL (ref 0.5–1.4)
EST. GFR  (NO RACE VARIABLE): 36.8 ML/MIN/1.73 M^2
GLUCOSE SERPL-MCNC: 135 MG/DL (ref 70–110)
POTASSIUM SERPL-SCNC: 4.3 MMOL/L (ref 3.5–5.1)
SODIUM SERPL-SCNC: 137 MMOL/L (ref 136–145)

## 2024-02-07 PROCEDURE — 80048 BASIC METABOLIC PNL TOTAL CA: CPT | Mod: HCNC | Performed by: INTERNAL MEDICINE

## 2024-02-07 PROCEDURE — 36415 COLL VENOUS BLD VENIPUNCTURE: CPT | Performed by: INTERNAL MEDICINE

## 2024-03-28 RX ORDER — LOSARTAN POTASSIUM AND HYDROCHLOROTHIAZIDE 25; 100 MG/1; MG/1
1 TABLET ORAL DAILY
Qty: 90 TABLET | Refills: 2 | Status: SHIPPED | OUTPATIENT
Start: 2024-03-28

## 2024-03-28 NOTE — TELEPHONE ENCOUNTER
Refill Routing Note   Medication(s) are not appropriate for processing by [facility] for the following reason(s):        Required labs abnormal    [facility] action(s):  Defer             Pharmacist review requested: Yes     Appointments  past 12m or future 3m with PCP    Date Provider   Last Visit   12/11/2023 Randy Campos MD   Next Visit   6/14/2024 Randy Campos MD   ED visits in past 90 days: 0        Note composed:7:53 AM 03/28/2024

## 2024-03-28 NOTE — TELEPHONE ENCOUNTER
No care due was identified.  [facility] Embedded Care Due Messages. Reference number: [id number].   3/28/2024 7:38:51 AM CDT

## 2024-03-28 NOTE — TELEPHONE ENCOUNTER
Merry Terrazas  is requesting a refill authorization.  Brief Assessment and Rationale for Refill:  Approve     Medication Therapy Plan:  renal fxn reviewed, dose ok      Pharmacist review requested: Yes   Extended chart review required: Yes   Comments:     Note composed:9:02 AM 03/28/2024

## 2024-06-12 DIAGNOSIS — Z78.0 MENOPAUSE: ICD-10-CM

## 2024-06-20 ENCOUNTER — HOSPITAL ENCOUNTER (OUTPATIENT)
Dept: RADIOLOGY | Facility: HOSPITAL | Age: 74
Discharge: HOME | End: 2024-06-20
Attending: INTERNAL MEDICINE
Payer: MEDICARE

## 2024-06-20 DIAGNOSIS — Z78.0 MENOPAUSE: ICD-10-CM

## 2024-06-20 PROCEDURE — 77080 DXA BONE DENSITY AXIAL: CPT | Mod: TC

## 2024-06-20 PROCEDURE — 77080 DXA BONE DENSITY AXIAL: CPT | Mod: 26,,, | Performed by: INTERNAL MEDICINE

## 2024-06-24 ENCOUNTER — OFFICE VISIT (OUTPATIENT)
Dept: PRIMARY CARE CLINIC | Facility: CLINIC | Age: 74
End: 2024-06-24
Payer: MEDICARE

## 2024-06-24 VITALS
TEMPERATURE: 98 F | SYSTOLIC BLOOD PRESSURE: 120 MMHG | HEART RATE: 74 BPM | BODY MASS INDEX: 30.99 KG/M2 | OXYGEN SATURATION: 96 % | RESPIRATION RATE: 18 BRPM | DIASTOLIC BLOOD PRESSURE: 80 MMHG | WEIGHT: 157.88 LBS | HEIGHT: 60 IN

## 2024-06-24 DIAGNOSIS — E78.5 BORDERLINE HYPERLIPIDEMIA: ICD-10-CM

## 2024-06-24 DIAGNOSIS — I12.9 HYPERTENSION, RENAL DISEASE, STAGE 1-4 OR UNSPECIFIED CHRONIC KIDNEY DISEASE: Primary | ICD-10-CM

## 2024-06-24 DIAGNOSIS — F33.0 DEPRESSION, MAJOR, RECURRENT, MILD: ICD-10-CM

## 2024-06-24 DIAGNOSIS — I77.9 LEFT-SIDED CAROTID ARTERY DISEASE, UNSPECIFIED TYPE: ICD-10-CM

## 2024-06-24 DIAGNOSIS — M81.0 OSTEOPOROSIS, UNSPECIFIED OSTEOPOROSIS TYPE, UNSPECIFIED PATHOLOGICAL FRACTURE PRESENCE: ICD-10-CM

## 2024-06-24 PROCEDURE — 1101F PT FALLS ASSESS-DOCD LE1/YR: CPT | Mod: CPTII,S$GLB,, | Performed by: INTERNAL MEDICINE

## 2024-06-24 PROCEDURE — 3008F BODY MASS INDEX DOCD: CPT | Mod: CPTII,S$GLB,, | Performed by: INTERNAL MEDICINE

## 2024-06-24 PROCEDURE — 3079F DIAST BP 80-89 MM HG: CPT | Mod: CPTII,S$GLB,, | Performed by: INTERNAL MEDICINE

## 2024-06-24 PROCEDURE — 3288F FALL RISK ASSESSMENT DOCD: CPT | Mod: CPTII,S$GLB,, | Performed by: INTERNAL MEDICINE

## 2024-06-24 PROCEDURE — 1126F AMNT PAIN NOTED NONE PRSNT: CPT | Mod: CPTII,S$GLB,, | Performed by: INTERNAL MEDICINE

## 2024-06-24 PROCEDURE — 99214 OFFICE O/P EST MOD 30 MIN: CPT | Mod: S$GLB,,, | Performed by: INTERNAL MEDICINE

## 2024-06-24 PROCEDURE — 1159F MED LIST DOCD IN RCRD: CPT | Mod: CPTII,S$GLB,, | Performed by: INTERNAL MEDICINE

## 2024-06-24 PROCEDURE — 3074F SYST BP LT 130 MM HG: CPT | Mod: CPTII,S$GLB,, | Performed by: INTERNAL MEDICINE

## 2024-06-24 PROCEDURE — 1160F RVW MEDS BY RX/DR IN RCRD: CPT | Mod: CPTII,S$GLB,, | Performed by: INTERNAL MEDICINE

## 2024-06-24 PROCEDURE — 99999 PR PBB SHADOW E&M-EST. PATIENT-LVL IV: CPT | Mod: PBBFAC,,, | Performed by: INTERNAL MEDICINE

## 2024-06-24 NOTE — PROGRESS NOTES
[facility] Note    Chief Complaint      Chief Complaint   Patient presents with    Follow-up       History of Present Illness      Merry Terrazas is a 74 y.o. female who presents today for   Chief Complaint   Patient presents with    Follow-up   .  Patient comes to appointment here for 6 m checkup for chronic issues as below . She is scheduled for repeat colonoscopy with dr velasquez next month . She is compliant with all meds .     Problem List Items Addressed This Visit          Psychiatric    Depression, major, recurrent, mild    Overview     celexa working well back on 20 mg and is doing well             Cardiac/Vascular    Borderline hyperlipidemia    Overview     Cont current regimen lipitor 80 mg          Left-sided carotid artery disease    Overview     S/p cea with Dr watters ct surg .             Renal/    Hypertension, renal disease, stage 1-4 or unspecified chronic kidney disease - Primary    Overview      losartan hctz 100/12.5 tolerating well and blood pressure well controlled             Endocrine    Osteoporosis    Overview     Cont calcium with d               Past Medical History:  Past Medical History:   Diagnosis Date    Anxiety     GERD (gastroesophageal reflux disease)     Glaucoma     Hypertension        Past Surgical History:  Past Surgical History:   Procedure Laterality Date    BOWEL RESECTION      polyp benign     SECTION      COLON SURGERY      colonoscopy  2021    OPEN REDUCTION AND INTERNAL FIXATION (ORIF) OF INJURY OF ELBOW Left 2021    Procedure: ORIF, ELBOW;  Surgeon: Claude S. Williams IV, MD;  Location: [facility];  Service: Orthopedics;  Laterality: Left;       Family History:  family history includes Hypertension in her maternal grandmother and mother; Stroke in her maternal grandmother and mother.    Social History:  Social History     Socioeconomic History    Marital status: Other   Tobacco Use    Smoking status: Never     Smokeless tobacco: Never   Substance and Sexual Activity    Alcohol use: Yes     Alcohol/week: 6.0 standard drinks of alcohol     Types: 6 Glasses of wine per week     Comment: wine most days    Drug use: Never    Sexual activity: Yes     Partners: Male     Birth control/protection: Post-menopausal     Social Determinants of Health     Financial Resource Strain: Low Risk  (12/3/2019)    Overall Financial Resource Strain (CARDIA)     Difficulty of Paying Living Expenses: Not hard at all   Food Insecurity: No Food Insecurity (12/3/2019)    Hunger Vital Sign     Worried About Running Out of Food in the Last Year: Never true     Ran Out of Food in the Last Year: Never true   Transportation Needs: No Transportation Needs (12/3/2019)    PRAPARE - Transportation     Lack of Transportation (Medical): No     Lack of Transportation (Non-Medical): No   Physical Activity: Sufficiently Active (12/3/2019)    Exercise Vital Sign     Days of Exercise per Week: 5 days     Minutes of Exercise per Session: 40 min   Stress: No Stress Concern Present (12/3/2019)    [employer] - Occupational Stress Questionnaire     Feeling of Stress : Not at all       Review of Systems:   Review of Systems   Constitutional:  Negative for fever and weight loss.   HENT:  Negative for congestion, hearing loss and sore throat.    Eyes:  Negative for blurred vision.   Respiratory:  Negative for cough and shortness of breath.    Cardiovascular:  Negative for chest pain, palpitations, claudication and leg swelling.   Gastrointestinal:  Negative for abdominal pain, constipation, diarrhea and heartburn.   Genitourinary:  Negative for dysuria.   Musculoskeletal:  Negative for back pain and myalgias.   Skin:  Negative for rash.   Neurological:  Negative for focal weakness and headaches.   Psychiatric/Behavioral:  Negative for depression and suicidal ideas. The patient is not nervous/anxious.          Medications:  Outpatient Encounter  Medications as of 6/24/2024   Medication Sig Dispense Refill    albuterol (VENTOLIN HFA) 90 mcg/actuation inhaler Inhale 2 puffs into the lungs every 6 (six) hours as needed for Wheezing. Rescue. Ok to substitute based on availability: Proair, Proventil, ventolin 18 g 0    amLODIPine (NORVASC) 5 MG tablet TAKE 1 TABLET (5 MG TOTAL) BY MOUTH ONCE DAILY. 90 tablet 2    atorvastatin (LIPITOR) 80 MG tablet TAKE 1 TABLET (80 MG TOTAL) BY MOUTH ONCE DAILY. 90 tablet 3    cetirizine (ZYRTEC) 10 MG tablet Take 1 tablet (10 mg total) by mouth once daily. 30 tablet 0    citalopram (CELEXA) 20 MG tablet TAKE ONE AND ONE-HALF BY MOUTH DAILY 135 tablet 3    Covid-19 vaccine, AD25.COV2.S (AYANA, COVID-19,) 0.5 mL injection Inject into the muscle. 0.5 mL 0    latanoprost 0.005 % ophthalmic solution   0    losartan-hydrochlorothiazide 100-25 mg (HYZAAR) 100-25 mg per tablet Take 1 tablet by mouth once daily. 90 tablet 2    vit C/vit E acet/lutein/min (OCUVITE LUTEIN ORAL) Take by mouth.      vitamin D (VITAMIN D3) 1000 units Tab Take 1,000 Units by mouth once daily.      aspirin (ASPIR-81 ORAL) Take by mouth.      esomeprazole (NEXIUM) 20 MG capsule Take 20 mg by mouth as needed.        No facility-administered encounter medications on file as of 6/24/2024.        Allergies:  Review of patient's allergies indicates:   Allergen Reactions    Ace inhibitors Swelling         Physical Exam         Vitals:    06/24/24 0914   BP: 120/80   Pulse: 74   Resp: 18   Temp: 98 °F (36.7 °C)         Physical Exam  Constitutional:       Appearance: She is well-developed.   Eyes:      Pupils: Pupils are equal, round, and reactive to light.   Neck:      Thyroid: No thyromegaly.   Cardiovascular:      Rate and Rhythm: Normal rate.      Heart sounds: Normal heart sounds. No murmur heard.     No friction rub. No gallop.   Pulmonary:      Breath sounds: Normal breath sounds.   Abdominal:      General: Bowel sounds are normal.      Palpations: Abdomen  "is soft.   Musculoskeletal:         General: Normal range of motion.      Cervical back: Normal range of motion.   Lymphadenopathy:      Cervical: No cervical adenopathy.   Skin:     General: Skin is warm.      Findings: No rash.   Neurological:      Mental Status: She is alert and oriented to person, place, and time.      Cranial Nerves: No cranial nerve deficit.   Psychiatric:         Behavior: Behavior normal.          Laboratory:  CBC:  No results for input(s): "WBC", "RBC", "HGB", "HCT", "PLT", "MCV", "MCH", "MCHC" in the last 2160 hours.  CMP:  No results for input(s): "GLU", "CALCIUM", "ALBUMIN", "PROT", "NA", "K", "CO2", "CL", "BUN", "ALKPHOS", "ALT", "AST", "BILITOT" in the last 2160 hours.    Invalid input(s): "CREATININ"  URINALYSIS:  No results for input(s): "COLORU", "CLARITYU", "SPECGRAV", "PHUR", "PROTEINUA", "GLUCOSEU", "BILIRUBINCON", "BLOODU", "WBCU", "RBCU", "BACTERIA", "MUCUS", "NITRITE", "LEUKOCYTESUR", "UROBILINOGEN", "HYALINECASTS" in the last 2160 hours.   LIPIDS:  No results for input(s): "TSH", "HDL", "CHOL", "TRIG", "LDLCALC", "CHOLHDL", "NONHDLCHOL", "TOTALCHOLEST" in the last 2160 hours.  TSH:  No results for input(s): "TSH" in the last 2160 hours.  A1C:  No results for input(s): "HGBA1C" in the last 2160 hours.    Radiology:        Assessment:     Merry Terrazas is a 74 y.o.female with:    Hypertension, renal disease, stage 1-4 or unspecified chronic kidney disease    Osteoporosis, unspecified osteoporosis type, unspecified pathological fracture presence    Left-sided carotid artery disease, unspecified type    Depression, major, recurrent, mild    Borderline hyperlipidemia          Plan:     Problem List Items Addressed This Visit          Psychiatric    Depression, major, recurrent, mild    Overview     celexa working well back on 20 mg and is doing well             Cardiac/Vascular    Borderline hyperlipidemia    Overview     Cont current regimen lipitor 80 mg          Left-sided " carotid artery disease    Overview     S/p cea with Dr watters ct surg .             Renal/    Hypertension, renal disease, stage 1-4 or unspecified chronic kidney disease - Primary    Overview      losartan hctz 100/12.5 tolerating well and blood pressure well controlled             Endocrine    Osteoporosis    Overview     Cont calcium with d             As above, continue current medications and maintain follow up with specialists.  Return to clinic in 6 months.      Frederick W Dantagnan [facility]

## 2024-06-25 DIAGNOSIS — F33.0 DEPRESSION, MAJOR, RECURRENT, MILD: ICD-10-CM

## 2024-06-25 RX ORDER — CITALOPRAM 20 MG/1
TABLET, FILM COATED ORAL
Qty: 135 TABLET | Refills: 3 | Status: SHIPPED | OUTPATIENT
Start: 2024-06-25

## 2024-06-25 NOTE — TELEPHONE ENCOUNTER
No care due was identified.  Health [facility] Embedded Care Due Messages. Reference number: [id number].   6/25/2024 7:58:42 AM CDT

## 2024-06-25 NOTE — TELEPHONE ENCOUNTER
Refill Decision Note   Merry Terrazas  is requesting a refill authorization.  Brief Assessment and Rationale for Refill:  Approve     Medication Therapy Plan:        Comments:     Note composed:8:04 AM 06/25/2024

## 2024-10-09 LAB — CRC RECOMMENDATION EXT: NORMAL

## 2024-10-11 ENCOUNTER — PATIENT OUTREACH (OUTPATIENT)
Dept: ADMINISTRATIVE | Facility: HOSPITAL | Age: 74
End: 2024-10-11
Payer: MEDICARE

## 2024-10-11 NOTE — LETTER
AUTHORIZATION FOR RELEASE OF   CONFIDENTIAL INFORMATION    Dear [facility],    We are seeing Merry Terrazas, date of birth 1950, in the clinic at [facility]. Randy Campos MD is the patient's PCP. Merry Terrazas has an outstanding lab/procedure at the time we reviewed her chart. In order to help keep her health information updated, she has authorized us to request the following medical record(s):        (  )  MAMMOGRAM                                      (X)  COLONOSCOPY- Pathology Results       (  )  PAP SMEAR                                          (  )  OUTSIDE LAB RESULTS     (  )  DEXA SCAN                                          (  )  EYE EXAM            (  )  FOOT EXAM                                          (  )  ENTIRE RECORD     (  )  OUTSIDE IMMUNIZATIONS                 (  )  _______________         Please fax records to [facility], Dantagnan, Frederick W., MD, [phone].     If you have any questions, please contact Dee at [phone].           Patient Name: Merry Terrazas  : 1950  Patient Phone #: [phone]

## 2024-10-11 NOTE — PROGRESS NOTES
Health Maintenance Due   Topic Date Due    RSV Vaccine (Age 60+ and Pregnant patients) (1 - Risk 60-74 years 1-dose series) Never done    Shingles Vaccine (2 of 3) 07/29/2014    Pneumococcal Vaccines (Age 65+) (2 of 2 - PPSV23 or PCV20) 04/25/2018    Influenza Vaccine (1) 09/01/2024    COVID-19 Vaccine (2 - 2024-25 season) 09/01/2024    Mammogram  01/02/2025     Chart review completed. HM Updated. Triggered Links. Immunizations reviewed and updated. Care Everywhere Updated. Care Team Updated.  Imported outside colonoscopy results from [facility] into /media. Repeat colonoscopy in 5 years per provider recommendation.   Requested pathology results.

## 2024-11-12 ENCOUNTER — OFFICE VISIT (OUTPATIENT)
Dept: INTERNAL MEDICINE | Facility: CLINIC | Age: 74
End: 2024-11-12
Payer: MEDICARE

## 2024-11-12 VITALS
HEIGHT: 60 IN | HEART RATE: 83 BPM | BODY MASS INDEX: 30.61 KG/M2 | WEIGHT: 155.88 LBS | DIASTOLIC BLOOD PRESSURE: 72 MMHG | RESPIRATION RATE: 18 BRPM | OXYGEN SATURATION: 98 % | TEMPERATURE: 98 F | SYSTOLIC BLOOD PRESSURE: 124 MMHG

## 2024-11-12 DIAGNOSIS — Z00.00 ENCOUNTER FOR PREVENTIVE HEALTH EXAMINATION: Primary | ICD-10-CM

## 2024-11-12 DIAGNOSIS — F33.0 DEPRESSION, MAJOR, RECURRENT, MILD: ICD-10-CM

## 2024-11-12 DIAGNOSIS — E78.5 BORDERLINE HYPERLIPIDEMIA: ICD-10-CM

## 2024-11-12 DIAGNOSIS — N18.32 CHRONIC KIDNEY DISEASE, STAGE 3B: ICD-10-CM

## 2024-11-12 DIAGNOSIS — I12.9 HYPERTENSION, RENAL DISEASE, STAGE 1-4 OR UNSPECIFIED CHRONIC KIDNEY DISEASE: ICD-10-CM

## 2024-11-12 DIAGNOSIS — Z23 NEED FOR PNEUMOCOCCAL VACCINE: ICD-10-CM

## 2024-11-12 PROCEDURE — 3288F FALL RISK ASSESSMENT DOCD: CPT | Mod: HCNC,CPTII,S$GLB, | Performed by: NURSE PRACTITIONER

## 2024-11-12 PROCEDURE — 1170F FXNL STATUS ASSESSED: CPT | Mod: HCNC,CPTII,S$GLB, | Performed by: NURSE PRACTITIONER

## 2024-11-12 PROCEDURE — 1159F MED LIST DOCD IN RCRD: CPT | Mod: HCNC,CPTII,S$GLB, | Performed by: NURSE PRACTITIONER

## 2024-11-12 PROCEDURE — 1126F AMNT PAIN NOTED NONE PRSNT: CPT | Mod: HCNC,CPTII,S$GLB, | Performed by: NURSE PRACTITIONER

## 2024-11-12 PROCEDURE — 1160F RVW MEDS BY RX/DR IN RCRD: CPT | Mod: HCNC,CPTII,S$GLB, | Performed by: NURSE PRACTITIONER

## 2024-11-12 PROCEDURE — 3074F SYST BP LT 130 MM HG: CPT | Mod: HCNC,CPTII,S$GLB, | Performed by: NURSE PRACTITIONER

## 2024-11-12 PROCEDURE — 90677 PCV20 VACCINE IM: CPT | Mod: HCNC,S$GLB,, | Performed by: NURSE PRACTITIONER

## 2024-11-12 PROCEDURE — G0009 ADMIN PNEUMOCOCCAL VACCINE: HCPCS | Mod: HCNC,S$GLB,, | Performed by: NURSE PRACTITIONER

## 2024-11-12 PROCEDURE — 1101F PT FALLS ASSESS-DOCD LE1/YR: CPT | Mod: HCNC,CPTII,S$GLB, | Performed by: NURSE PRACTITIONER

## 2024-11-12 PROCEDURE — 3078F DIAST BP <80 MM HG: CPT | Mod: HCNC,CPTII,S$GLB, | Performed by: NURSE PRACTITIONER

## 2024-11-12 PROCEDURE — 99999 PR PBB SHADOW E&M-EST. PATIENT-LVL IV: CPT | Mod: PBBFAC,HCNC,, | Performed by: NURSE PRACTITIONER

## 2024-11-12 PROCEDURE — 1158F ADVNC CARE PLAN TLK DOCD: CPT | Mod: HCNC,CPTII,S$GLB, | Performed by: NURSE PRACTITIONER

## 2024-11-12 PROCEDURE — G0439 PPPS, SUBSEQ VISIT: HCPCS | Mod: HCNC,S$GLB,, | Performed by: NURSE PRACTITIONER

## 2024-11-12 NOTE — PROGRESS NOTES
Merry Terrazas presented for a  Medicare AWV and comprehensive Health Risk Assessment today. The following components were reviewed and updated:    Medical history  Family History  Social history  Allergies and Current Medications  Health Risk Assessment  Health Maintenance  Care Team         ** See Completed Assessments for Annual Wellness Visit within the encounter summary.**         The following assessments were completed:  Living Situation  CAGE  Depression Screening  Timed Get Up and Go  Whisper Test  Cognitive Function Screening  Nutrition Screening  ADL Screening  PAQ Screening      Opioid documentation:      Patient does not have a current opioid prescription.        Vitals:    11/12/24 1336   BP: 124/72   BP Location: Left arm   Patient Position: Sitting   Pulse: 83   Resp: 18   Temp: 97.5 °F (36.4 °C)   TempSrc: Temporal   SpO2: 98%   Weight: 70.7 kg (155 lb 13.8 oz)   Height: 5' (1.524 m)     Body mass index is 30.44 kg/m².  Physical Exam  Vitals and nursing note reviewed.   Constitutional:       Appearance: She is well-developed.   HENT:      Head: Normocephalic and atraumatic.      Right Ear: External ear normal.      Left Ear: External ear normal.      Nose: Nose normal.   Eyes:      Pupils: Pupils are equal, round, and reactive to light.   Cardiovascular:      Rate and Rhythm: Normal rate and regular rhythm.      Heart sounds: Normal heart sounds.   Pulmonary:      Effort: Pulmonary effort is normal.      Breath sounds: Normal breath sounds.   Musculoskeletal:         General: Normal range of motion.      Cervical back: Normal range of motion.   Skin:     General: Skin is warm and dry.   Neurological:      Mental Status: She is alert and oriented to person, place, and time.               Diagnoses and health risks identified today and associated recommendations/orders:    1. Encounter for preventive health examination  Health Maintenance updated   Records reviewed   Exam done     2. Depression,  major, recurrent, mild  PHQ 0 today. Stable and chronic. Followed by PCP.     3. Chronic kidney disease, stage 3b  GFR decreases noted. Avoid nephrotoxic medications. Stable and chronic.     4. Hypertension, renal disease, stage 1-4 or unspecified chronic kidney disease  Stable, followed by PCP   Take medications as prescribed.   Monitor BP at home, goal BP < or = 140/80, call office if consistently above this range.   Follow low salt DASH diet and exercise.   BMI reviewed.     5. Borderline hyperlipidemia  Stable and chronic. Continue current medications. Followed by PCP.     6. Need for pneumococcal vaccine  - pneumoc 20-natasha conj-dip cr(PF) (PREVNAR-20 (PF)) injection Syrg 0.5 mL  Counseling and Referral of Other Preventative  (Italic type indicates deductible and co-insurance are waived)    Patient Name: Merry Terrazas  Today's Date: 11/12/2024    Health Maintenance         Date Due Completion Date    RSV Vaccine (Age 60+ and Pregnant patients) (1 - Risk 60-74 years 1-dose series) Never done ---    Shingles Vaccine (2 of 3) 07/29/2014 6/3/2014    Pneumococcal Vaccines (Age 65+) (2 of 2 - PPSV23 or PCV20) 04/25/2018 4/25/2017    Influenza Vaccine (1) 09/01/2024 10/14/2023    Override on 9/2/2020: Done    COVID-19 Vaccine (2 - 2024-25 season) 09/01/2024 8/16/2021    Mammogram 01/02/2025 1/2/2024    DEXA Scan 06/20/2026 6/20/2024    TETANUS VACCINE 06/07/2028 6/7/2018    Lipid Panel 12/14/2028 12/14/2023    Colorectal Cancer Screening 10/09/2029 10/9/2024          No orders of the defined types were placed in this encounter.      Provided Merry with a 5-10 year written screening schedule and personal prevention plan. Recommendations were developed using the USPSTF age appropriate recommendations. Education, counseling, and referrals were provided as needed. After Visit Summary printed and given to patient which includes a list of additional screenings\tests needed.    Follow up in about 7 weeks (around 12/31/2024)  for PCP visit.    Raquel Ross, NP      I offered to discuss advanced care planning, including how to pick a person who would make decisions for you if you were unable to make them for yourself, called a health care power of , and what kind of decisions you might make such as use of life sustaining treatments such as ventilators and tube feeding when faced with a life limiting illness recorded on a living will that they will need to know. (How you want to be cared for as you near the end of your natural life)     X Patient is interested in learning more about how to make advanced directives.  I provided them paperwork and offered to discuss this with them.

## 2024-11-12 NOTE — PATIENT INSTRUCTIONS
Counseling and Referral of Other Preventative  (Italic type indicates deductible and co-insurance are waived)    Patient Name: Merry Terrazas  Today's Date: 11/12/2024    Health Maintenance       Date Due Completion Date    RSV Vaccine (Age 60+ and Pregnant patients) (1 - Risk 60-74 years 1-dose series) Never done ---    Shingles Vaccine (2 of 3) 07/29/2014 6/3/2014    Pneumococcal Vaccines (Age 65+) (2 of 2 - PPSV23 or PCV20) 04/25/2018 4/25/2017    Influenza Vaccine (1) 09/01/2024 10/14/2023    Override on 9/2/2020: Done    COVID-19 Vaccine (2 - 2024-25 season) 09/01/2024 8/16/2021    Mammogram 01/02/2025 1/2/2024    DEXA Scan 06/20/2026 6/20/2024    TETANUS VACCINE 06/07/2028 6/7/2018    Lipid Panel 12/14/2028 12/14/2023    Colorectal Cancer Screening 10/09/2029 10/9/2024        No orders of the defined types were placed in this encounter.    The following information is provided to all patients.  This information is to help you find resources for any of the problems found today that may be affecting your health:                  Living healthy guide: www.[facility].[place].gov      Understanding Diabetes: www.diabetes.org      Eating healthy: www.cdc.gov/healthyweight      CDC home safety checklist: www.cdc.gov/steadi/patient.html      Agency on Aging: www.goea.[place].gov      Alcoholics anonymous (AA): www.aa.org      Physical Activity: www.edilberto.nih.gov/qd9kphc      Tobacco use: www.quitwithusla.org

## 2024-12-31 ENCOUNTER — LAB VISIT (OUTPATIENT)
Dept: LAB | Facility: HOSPITAL | Age: 74
End: 2024-12-31
Attending: INTERNAL MEDICINE
Payer: MEDICARE

## 2024-12-31 ENCOUNTER — OFFICE VISIT (OUTPATIENT)
Dept: PRIMARY CARE CLINIC | Facility: CLINIC | Age: 74
End: 2024-12-31
Payer: MEDICARE

## 2024-12-31 VITALS
HEIGHT: 60 IN | WEIGHT: 156.75 LBS | BODY MASS INDEX: 30.77 KG/M2 | SYSTOLIC BLOOD PRESSURE: 120 MMHG | HEART RATE: 78 BPM | RESPIRATION RATE: 18 BRPM | DIASTOLIC BLOOD PRESSURE: 70 MMHG | OXYGEN SATURATION: 95 %

## 2024-12-31 DIAGNOSIS — Z00.00 ANNUAL PHYSICAL EXAM: Primary | ICD-10-CM

## 2024-12-31 DIAGNOSIS — E78.5 BORDERLINE HYPERLIPIDEMIA: ICD-10-CM

## 2024-12-31 DIAGNOSIS — I77.9 LEFT-SIDED CAROTID ARTERY DISEASE, UNSPECIFIED TYPE: ICD-10-CM

## 2024-12-31 DIAGNOSIS — I12.9 HYPERTENSION, RENAL DISEASE, STAGE 1-4 OR UNSPECIFIED CHRONIC KIDNEY DISEASE: ICD-10-CM

## 2024-12-31 LAB
ALBUMIN SERPL BCP-MCNC: 3.7 G/DL (ref 3.5–5.2)
ALP SERPL-CCNC: 60 U/L (ref 40–150)
ALT SERPL W/O P-5'-P-CCNC: 32 U/L (ref 10–44)
ANION GAP SERPL CALC-SCNC: 11 MMOL/L (ref 8–16)
AST SERPL-CCNC: 33 U/L (ref 10–40)
BASOPHILS # BLD AUTO: 0.06 K/UL (ref 0–0.2)
BASOPHILS NFR BLD: 1.2 % (ref 0–1.9)
BILIRUB SERPL-MCNC: 0.7 MG/DL (ref 0.1–1)
BUN SERPL-MCNC: 34 MG/DL (ref 8–23)
CALCIUM SERPL-MCNC: 9.6 MG/DL (ref 8.7–10.5)
CHLORIDE SERPL-SCNC: 104 MMOL/L (ref 95–110)
CHOLEST SERPL-MCNC: 201 MG/DL (ref 120–199)
CHOLEST/HDLC SERPL: 2.6 {RATIO} (ref 2–5)
CO2 SERPL-SCNC: 25 MMOL/L (ref 23–29)
CREAT SERPL-MCNC: 1.5 MG/DL (ref 0.5–1.4)
DIFFERENTIAL METHOD BLD: ABNORMAL
EOSINOPHIL # BLD AUTO: 0.3 K/UL (ref 0–0.5)
EOSINOPHIL NFR BLD: 5.6 % (ref 0–8)
ERYTHROCYTE [DISTWIDTH] IN BLOOD BY AUTOMATED COUNT: 14.1 % (ref 11.5–14.5)
EST. GFR  (NO RACE VARIABLE): 36.8 ML/MIN/1.73 M^2
GLUCOSE SERPL-MCNC: 95 MG/DL (ref 70–110)
HCT VFR BLD AUTO: 41.9 % (ref 37–48.5)
HDLC SERPL-MCNC: 78 MG/DL (ref 40–75)
HDLC SERPL: 38.8 % (ref 20–50)
HGB BLD-MCNC: 13.3 G/DL (ref 12–16)
IMM GRANULOCYTES # BLD AUTO: 0.02 K/UL (ref 0–0.04)
IMM GRANULOCYTES NFR BLD AUTO: 0.4 % (ref 0–0.5)
LDLC SERPL CALC-MCNC: 102 MG/DL (ref 63–159)
LYMPHOCYTES # BLD AUTO: 1.7 K/UL (ref 1–4.8)
LYMPHOCYTES NFR BLD: 34.2 % (ref 18–48)
MCH RBC QN AUTO: 31.5 PG (ref 27–31)
MCHC RBC AUTO-ENTMCNC: 31.7 G/DL (ref 32–36)
MCV RBC AUTO: 99 FL (ref 82–98)
MONOCYTES # BLD AUTO: 0.6 K/UL (ref 0.3–1)
MONOCYTES NFR BLD: 11.6 % (ref 4–15)
NEUTROPHILS # BLD AUTO: 2.4 K/UL (ref 1.8–7.7)
NEUTROPHILS NFR BLD: 47 % (ref 38–73)
NONHDLC SERPL-MCNC: 123 MG/DL
NRBC BLD-RTO: 0 /100 WBC
PLATELET # BLD AUTO: 273 K/UL (ref 150–450)
PMV BLD AUTO: 9.4 FL (ref 9.2–12.9)
POTASSIUM SERPL-SCNC: 4.5 MMOL/L (ref 3.5–5.1)
PROT SERPL-MCNC: 8 G/DL (ref 6–8.4)
RBC # BLD AUTO: 4.22 M/UL (ref 4–5.4)
SODIUM SERPL-SCNC: 140 MMOL/L (ref 136–145)
TRIGL SERPL-MCNC: 105 MG/DL (ref 30–150)
WBC # BLD AUTO: 5 K/UL (ref 3.9–12.7)

## 2024-12-31 PROCEDURE — 3078F DIAST BP <80 MM HG: CPT | Mod: CPTII,S$GLB,, | Performed by: INTERNAL MEDICINE

## 2024-12-31 PROCEDURE — 99999 PR PBB SHADOW E&M-EST. PATIENT-LVL III: CPT | Mod: PBBFAC,,, | Performed by: INTERNAL MEDICINE

## 2024-12-31 PROCEDURE — 85025 COMPLETE CBC W/AUTO DIFF WBC: CPT | Mod: HCNC | Performed by: INTERNAL MEDICINE

## 2024-12-31 PROCEDURE — 1160F RVW MEDS BY RX/DR IN RCRD: CPT | Mod: CPTII,S$GLB,, | Performed by: INTERNAL MEDICINE

## 2024-12-31 PROCEDURE — 1126F AMNT PAIN NOTED NONE PRSNT: CPT | Mod: CPTII,S$GLB,, | Performed by: INTERNAL MEDICINE

## 2024-12-31 PROCEDURE — 1101F PT FALLS ASSESS-DOCD LE1/YR: CPT | Mod: CPTII,S$GLB,, | Performed by: INTERNAL MEDICINE

## 2024-12-31 PROCEDURE — 36415 COLL VENOUS BLD VENIPUNCTURE: CPT | Performed by: INTERNAL MEDICINE

## 2024-12-31 PROCEDURE — 3074F SYST BP LT 130 MM HG: CPT | Mod: CPTII,S$GLB,, | Performed by: INTERNAL MEDICINE

## 2024-12-31 PROCEDURE — 99397 PER PM REEVAL EST PAT 65+ YR: CPT | Mod: S$GLB,,, | Performed by: INTERNAL MEDICINE

## 2024-12-31 PROCEDURE — 3288F FALL RISK ASSESSMENT DOCD: CPT | Mod: CPTII,S$GLB,, | Performed by: INTERNAL MEDICINE

## 2024-12-31 PROCEDURE — 3008F BODY MASS INDEX DOCD: CPT | Mod: CPTII,S$GLB,, | Performed by: INTERNAL MEDICINE

## 2024-12-31 PROCEDURE — 80061 LIPID PANEL: CPT | Mod: HCNC | Performed by: INTERNAL MEDICINE

## 2024-12-31 PROCEDURE — 80053 COMPREHEN METABOLIC PANEL: CPT | Mod: HCNC | Performed by: INTERNAL MEDICINE

## 2024-12-31 PROCEDURE — 1159F MED LIST DOCD IN RCRD: CPT | Mod: CPTII,S$GLB,, | Performed by: INTERNAL MEDICINE

## 2024-12-31 NOTE — PROGRESS NOTES
Dr watters ct , dr [facility] Note    Chief Complaint      Chief Complaint   Patient presents with    Annual Exam       History of Present Illness      Merry Terrazas is a 74 y.o. female who presents today for   Chief Complaint   Patient presents with    Annual Exam   .  Patient comes to appointment here for [employer] annual wellness exan she is current seeing Dr eva hamilton , dr watters , dr siobhan iniguezho she is complaint independent of all adls , can ambulate bot in and out of home with no difficulty . No hearing complaints . Si feeling well on her current regimen of celexa . She is complaint with all meds . Is due for mammogram next year     Problem List Items Addressed This Visit       Hypertension, renal disease, stage 1-4 or unspecified chronic kidney disease    Overview      losartan hctz 100/12.5 tolerating well and blood pressure well controlled          Annual physical exam - Primary    Overview     pe documented . Will review vaccines . cont all specialist f/u . Will get full labs as well          Borderline hyperlipidemia    Overview     Cont current regimen lipitor 80 mg          Left-sided carotid artery disease    Overview     S/p cea with Dr watters ct surg . Stable               Past Medical History:  Past Medical History:   Diagnosis Date    Anxiety     GERD (gastroesophageal reflux disease)     Glaucoma     Hypertension        Past Surgical History:  Past Surgical History:   Procedure Laterality Date    BOWEL RESECTION      polyp benign     SECTION      COLON SURGERY      colonoscopy  2021    EYE SURGERY      Cataracts    FRACTURE SURGERY      Elbow    OPEN REDUCTION AND INTERNAL FIXATION (ORIF) OF INJURY OF ELBOW Left 2021    Procedure: ORIF, ELBOW;  Surgeon: Claude S. Williams IV, MD;  Location: [facility];  Service: Orthopedics;  Laterality: Left;       Family History:  family history includes Cancer in her mother; Hearing loss in her  father and mother; Heart disease in her father; Hypertension in her maternal grandmother and mother; Stroke in her maternal grandmother and mother; Vision loss in her father and mother.    Social History:  Social History     Socioeconomic History    Marital status: Other   Tobacco Use    Smoking status: Never    Smokeless tobacco: Never   Substance and Sexual Activity    Alcohol use: Yes     Alcohol/week: 4.0 standard drinks of alcohol     Types: 4 Glasses of wine per week     Comment: wine most days    Drug use: Never    Sexual activity: Not Currently     Partners: Male     Birth control/protection: Post-menopausal     Social Drivers of Health     Financial Resource Strain: Low Risk  (11/11/2024)    Overall Financial Resource Strain (CARDIA)     Difficulty of Paying Living Expenses: Not hard at all   Food Insecurity: No Food Insecurity (11/11/2024)    Hunger Vital Sign     Worried About Running Out of Food in the Last Year: Never true     Ran Out of Food in the Last Year: Never true   Transportation Needs: No Transportation Needs (12/3/2019)    PRAPARE - Transportation     Lack of Transportation (Medical): No     Lack of Transportation (Non-Medical): No   Physical Activity: Insufficiently Active (11/11/2024)    Exercise Vital Sign     Days of Exercise per Week: 3 days     Minutes of Exercise per Session: 30 min   Stress: No Stress Concern Present (11/11/2024)    [employer] - Occupational Stress Questionnaire     Feeling of Stress : Only a little   Housing Stability: Unknown (11/11/2024)    Housing Stability Vital Sign     Unable to Pay for Housing in the Last Year: No       Review of Systems:   Review of Systems   Constitutional:  Negative for fever and weight loss.   HENT:  Negative for congestion, hearing loss and sore throat.    Eyes:  Negative for blurred vision.   Respiratory:  Negative for cough and shortness of breath.    Cardiovascular:  Negative for chest pain, palpitations,  claudication and leg swelling.   Gastrointestinal:  Negative for abdominal pain, constipation, diarrhea and heartburn.   Genitourinary:  Negative for dysuria.   Musculoskeletal:  Negative for back pain and myalgias.   Skin:  Negative for rash.   Neurological:  Negative for focal weakness and headaches.   Psychiatric/Behavioral:  Negative for depression and suicidal ideas. The patient is not nervous/anxious.          Medications:  Outpatient Encounter Medications as of 12/31/2024   Medication Sig Dispense Refill    amLODIPine (NORVASC) 5 MG tablet TAKE 1 TABLET (5 MG TOTAL) BY MOUTH ONCE DAILY. 90 tablet 3    aspirin (ASPIR-81 ORAL) Take by mouth.      atorvastatin (LIPITOR) 80 MG tablet TAKE 1 TABLET (80 MG TOTAL) BY MOUTH ONCE DAILY. 90 tablet 0    cetirizine (ZYRTEC) 10 MG tablet Take 1 tablet (10 mg total) by mouth once daily. 30 tablet 0    citalopram (CELEXA) 20 MG tablet TAKE ONE AND ONE-HALF BY MOUTH DAILY 135 tablet 3    Covid-19 vaccine, AD25.COV2.S (AYANA, COVID-19,) 0.5 mL injection Inject into the muscle. 0.5 mL 0    latanoprost 0.005 % ophthalmic solution   0    losartan-hydrochlorothiazide 100-25 mg (HYZAAR) 100-25 mg per tablet TAKE 1 TABLET BY MOUTH ONCE DAILY. 90 tablet 1    vit C/vit E acet/lutein/min (OCUVITE LUTEIN ORAL) Take by mouth.      vitamin D (VITAMIN D3) 1000 units Tab Take 1,000 Units by mouth once daily.       No facility-administered encounter medications on file as of 12/31/2024.        Allergies:  Review of patient's allergies indicates:   Allergen Reactions    Ace inhibitors Swelling         Physical Exam         Vitals:    12/31/24 0846   BP: 120/70   Pulse: 78   Resp: 18         Physical Exam  Constitutional:       Appearance: She is well-developed.   Eyes:      Pupils: Pupils are equal, round, and reactive to light.   Neck:      Thyroid: No thyromegaly.   Cardiovascular:      Rate and Rhythm: Normal rate.      Heart sounds: Normal heart sounds. No murmur heard.     No friction  "rub. No gallop.   Pulmonary:      Breath sounds: Normal breath sounds.   Abdominal:      General: Bowel sounds are normal.      Palpations: Abdomen is soft.   Musculoskeletal:         General: Normal range of motion.      Cervical back: Normal range of motion.   Lymphadenopathy:      Cervical: No cervical adenopathy.   Skin:     General: Skin is warm.      Findings: No rash.   Neurological:      Mental Status: She is alert and oriented to person, place, and time.      Cranial Nerves: No cranial nerve deficit.   Psychiatric:         Behavior: Behavior normal.          Laboratory:  CBC:  No results for input(s): "WBC", "RBC", "HGB", "HCT", "PLT", "MCV", "MCH", "MCHC" in the last 2160 hours.  CMP:  No results for input(s): "GLU", "CALCIUM", "ALBUMIN", "PROT", "NA", "K", "CO2", "CL", "BUN", "ALKPHOS", "ALT", "AST", "BILITOT" in the last 2160 hours.    Invalid input(s): "CREATININ"  URINALYSIS:  No results for input(s): "COLORU", "CLARITYU", "SPECGRAV", "PHUR", "PROTEINUA", "GLUCOSEU", "BILIRUBINCON", "BLOODU", "WBCU", "RBCU", "BACTERIA", "MUCUS", "NITRITE", "LEUKOCYTESUR", "UROBILINOGEN", "HYALINECASTS" in the last 2160 hours.   LIPIDS:  No results for input(s): "TSH", "HDL", "CHOL", "TRIG", "LDLCALC", "CHOLHDL", "NONHDLCHOL", "TOTALCHOLEST" in the last 2160 hours.  TSH:  No results for input(s): "TSH" in the last 2160 hours.  A1C:  No results for input(s): "HGBA1C" in the last 2160 hours.    Radiology:        Assessment:     Merry Terrazas is a 74 y.o.female with:    Annual physical exam    Left-sided carotid artery disease, unspecified type    Hypertension, renal disease, stage 1-4 or unspecified chronic kidney disease  -     CBC Auto Differential; Future; Expected date: 12/31/2024    Borderline hyperlipidemia  -     Comprehensive Metabolic Panel; Future; Expected date: 12/31/2024  -     Lipid Panel; Future; Expected date: 12/31/2024          Plan:     Problem List Items Addressed This Visit       Hypertension, " renal disease, stage 1-4 or unspecified chronic kidney disease    Overview      losartan hctz 100/12.5 tolerating well and blood pressure well controlled          Annual physical exam - Primary    Overview     pe documented . Will review vaccines . cont all specialist f/u . Will get full labs as well          Borderline hyperlipidemia    Overview     Cont current regimen lipitor 80 mg          Left-sided carotid artery disease    Overview     S/p cea with Dr watters ct surg . Stable             As above, continue current medications and maintain follow up with specialists.  Return to clinic in 12 months.      Frederick W Dantagnan [facility]

## 2025-01-03 DIAGNOSIS — Z71.9 HEALTH EDUCATION/COUNSELING: Primary | ICD-10-CM

## 2025-03-12 RX ORDER — ATORVASTATIN CALCIUM 80 MG/1
80 TABLET, FILM COATED ORAL
Qty: 90 TABLET | Refills: 3 | Status: SHIPPED | OUTPATIENT
Start: 2025-03-12

## 2025-03-12 NOTE — TELEPHONE ENCOUNTER
No care due was identified.  [facility] Embedded Care Due Messages. Reference number: [id number].   3/12/2025 3:04:55 PM CDT

## 2025-03-13 RX ORDER — LOSARTAN POTASSIUM AND HYDROCHLOROTHIAZIDE 25; 100 MG/1; MG/1
1 TABLET ORAL DAILY
Qty: 90 TABLET | Refills: 3 | Status: SHIPPED | OUTPATIENT
Start: 2025-03-13

## 2025-03-13 NOTE — TELEPHONE ENCOUNTER
Refill Routing Note   Medication(s) are not appropriate for processing by [facility] for the following reason(s):        Required labs abnormal    [facility] action(s):  Defer  Approve           Pharmacist review requested: Yes   Extended chart review required: Yes     Appointments  past 12m or future 3m with PCP    Date Provider   Last Visit   12/31/2024 Randy Campos MD   Next Visit   7/1/2025 Randy Campos MD   ED visits in past 90 days: 0        Note composed:8:22 PM 03/12/2025

## 2025-03-13 NOTE — TELEPHONE ENCOUNTER
Refill Routing Note   Medication(s) are not appropriate for processing by [facility] for the following reason(s):        Required labs abnormal    [facility] action(s):  Defer  Approve           Pharmacist review requested: Yes     Appointments  past 12m or future 3m with PCP    Date Provider   Last Visit   12/31/2024 Randy Campos MD   Next Visit   7/1/2025 Randy Campos MD   ED visits in past 90 days: 0        Note composed:8:16 PM 03/12/2025

## 2025-06-12 DIAGNOSIS — F33.0 DEPRESSION, MAJOR, RECURRENT, MILD: ICD-10-CM

## 2025-06-12 RX ORDER — CITALOPRAM 20 MG/1
TABLET ORAL
Qty: 135 TABLET | Refills: 1 | Status: SHIPPED | OUTPATIENT
Start: 2025-06-12

## 2025-06-12 RX ORDER — AMLODIPINE BESYLATE 5 MG/1
5 TABLET ORAL DAILY
Qty: 90 TABLET | Refills: 1 | Status: SHIPPED | OUTPATIENT
Start: 2025-06-12

## 2025-06-12 NOTE — TELEPHONE ENCOUNTER
Refill Decision Note   Merry Terrazas  is requesting a refill authorization.  Brief Assessment and Rationale for Refill:  Approve     Medication Therapy Plan:         Comments:     Note composed:11:32 AM 06/12/2025

## 2025-06-12 NOTE — TELEPHONE ENCOUNTER
No care due was identified.  [facility] Embedded Care Due Messages. Reference number: [id number].   6/12/2025 8:17:09 AM CDT

## 2025-07-01 ENCOUNTER — OFFICE VISIT (OUTPATIENT)
Dept: PRIMARY CARE CLINIC | Facility: CLINIC | Age: 75
End: 2025-07-01
Payer: MEDICARE

## 2025-07-01 VITALS
OXYGEN SATURATION: 98 % | WEIGHT: 155.44 LBS | SYSTOLIC BLOOD PRESSURE: 122 MMHG | RESPIRATION RATE: 18 BRPM | HEART RATE: 70 BPM | HEIGHT: 60 IN | BODY MASS INDEX: 30.52 KG/M2 | DIASTOLIC BLOOD PRESSURE: 78 MMHG

## 2025-07-01 DIAGNOSIS — F33.0 DEPRESSION, MAJOR, RECURRENT, MILD: ICD-10-CM

## 2025-07-01 DIAGNOSIS — M81.0 OSTEOPOROSIS, UNSPECIFIED OSTEOPOROSIS TYPE, UNSPECIFIED PATHOLOGICAL FRACTURE PRESENCE: ICD-10-CM

## 2025-07-01 DIAGNOSIS — N18.32 STAGE 3B CHRONIC KIDNEY DISEASE: ICD-10-CM

## 2025-07-01 DIAGNOSIS — I12.9 HYPERTENSION, RENAL DISEASE, STAGE 1-4 OR UNSPECIFIED CHRONIC KIDNEY DISEASE: Primary | ICD-10-CM

## 2025-07-01 DIAGNOSIS — E78.5 BORDERLINE HYPERLIPIDEMIA: ICD-10-CM

## 2025-07-01 DIAGNOSIS — I77.9 LEFT-SIDED CAROTID ARTERY DISEASE, UNSPECIFIED TYPE: ICD-10-CM

## 2025-07-01 PROBLEM — N18.30 CKD (CHRONIC KIDNEY DISEASE) STAGE 3, GFR 30-59 ML/MIN: Status: ACTIVE | Noted: 2025-07-01

## 2025-07-01 PROCEDURE — 99999 PR PBB SHADOW E&M-EST. PATIENT-LVL III: CPT | Mod: PBBFAC,,, | Performed by: INTERNAL MEDICINE

## 2025-07-01 NOTE — PROGRESS NOTES
[facility] Note    Chief Complaint      Chief Complaint   Patient presents with    Follow-up       History of Present Illness      Merry Terrazas is a 75 y.o. female who presents today for   Chief Complaint   Patient presents with    Follow-up   .  Patient comes to appointment here feor 6 m checkup for chron c issues a sbelow . She is feeling well is compaint with all meds . She is off celexa as she had some dental  issues related to this . She is uptpdate ieth mammogram and colonoscopy .    Problem List Items Addressed This Visit       Hypertension, renal disease, stage 1-4 or unspecified chronic kidney disease - Primary    Overview    losartan hctz 100/12.5 tolerating well and blood pressure well controlled          Depression, major, recurrent, mild    Overview   Is off celexa due to some issues related to teeth . She feels ok currently          Borderline hyperlipidemia    Overview   Cont current regimen lipitor 80 mg          Left-sided carotid artery disease    Overview   S/p cea with Dr watters ct surg . Stable          Osteoporosis    Overview   Cont calcium with d          Stage 3b chronic kidney disease    Overview   Stable               Past Medical History:  Past Medical History:   Diagnosis Date    Anxiety     GERD (gastroesophageal reflux disease)     Glaucoma     Hypertension        Past Surgical History:  Past Surgical History:   Procedure Laterality Date    BOWEL RESECTION      polyp benign     SECTION      COLON SURGERY      colonoscopy  2021    EYE SURGERY      Cataracts    FRACTURE SURGERY      Elbow    OPEN REDUCTION AND INTERNAL FIXATION (ORIF) OF INJURY OF ELBOW Left 2021    Procedure: ORIF, ELBOW;  Surgeon: Claude S. Williams IV, MD;  Location: [facility];  Service: Orthopedics;  Laterality: Left;       Family History:  family history includes Cancer in her mother; Hearing loss in her father and mother; Heart disease in her father;  Hypertension in her maternal grandmother and mother; Stroke in her maternal grandmother and mother; Vision loss in her father and mother.    Social History:  Social History[1]    Review of Systems:   Review of Systems   Constitutional:  Negative for fever and weight loss.   HENT:  Negative for congestion, hearing loss and sore throat.    Eyes:  Negative for blurred vision.   Respiratory:  Negative for cough and shortness of breath.    Cardiovascular:  Negative for chest pain, palpitations, claudication and leg swelling.   Gastrointestinal:  Negative for abdominal pain, constipation, diarrhea, heartburn, nausea and vomiting.   Genitourinary:  Negative for dysuria.   Musculoskeletal:  Negative for back pain and myalgias.   Skin:  Negative for rash.   Neurological:  Negative for focal weakness and headaches.   Psychiatric/Behavioral:  Negative for depression, memory loss and suicidal ideas. The patient is not nervous/anxious.          Medications:  Encounter Medications[2]     Allergies:  Review of patient's allergies indicates:   Allergen Reactions    Ace inhibitors Swelling         Physical Exam         Vitals:    07/01/25 0928   BP: 122/78   Pulse: 70   Resp: 18         Physical Exam  Constitutional:       Appearance: She is well-developed.   Eyes:      Pupils: Pupils are equal, round, and reactive to light.   Neck:      Thyroid: No thyromegaly.   Cardiovascular:      Rate and Rhythm: Normal rate.      Heart sounds: Normal heart sounds. No murmur heard.     No friction rub. No gallop.   Pulmonary:      Breath sounds: Normal breath sounds.   Abdominal:      General: Bowel sounds are normal.      Palpations: Abdomen is soft.   Musculoskeletal:         General: Normal range of motion.      Cervical back: Normal range of motion.   Lymphadenopathy:      Cervical: No cervical adenopathy.   Skin:     General: Skin is warm.      Findings: No rash.   Neurological:      Mental Status: She is alert and oriented to person,  "place, and time.      Cranial Nerves: No cranial nerve deficit.   Psychiatric:         Behavior: Behavior normal.          Laboratory:  CBC:  Recent Labs   Lab Result Units 04/17/25  1030   WBC K/uL 4.34   RBC M/uL 4.42   HGB gm/dL 13.9   HCT % 42.9   Platelet Count K/uL 290   MCV fL 97   MCH pg 31.4*   MCHC g/dL 32.4     CMP:  Recent Labs   Lab Result Units 04/17/25  1030   Glucose mg/dL 88   Calcium mg/dL 9.4   Albumin g/dL 3.5   Protein Total gm/dL 6.9   Sodium mmol/L 140   Potassium mmol/L 4.6   CO2 mmol/L 26   Chloride mmol/L 105   BUN mg/dL 28*   ALP unit/L 57   ALT unit/L 23   AST unit/L 26   Bilirubin Total mg/dL 0.8     URINALYSIS:  No results for input(s): "COLORU", "CLARITYU", "SPECGRAV", "PHUR", "PROTEINUA", "GLUCOSEU", "BILIRUBINCON", "BLOODU", "WBCU", "RBCU", "BACTERIA", "MUCUS", "NITRITE", "LEUKOCYTESUR", "UROBILINOGEN", "HYALINECASTS" in the last 2160 hours.   LIPIDS:  Recent Labs   Lab Result Units 04/17/25  1030   HDL Cholesterol mg/dL 64   Cholesterol Total mg/dL 177   Triglyceride mg/dL 137   LDL Cholesterol mg/dL 85.6   HDL/Cholesterol Ratio % 36.2   Non HDL Cholesterol mg/dL 113   Cholesterol/HDL Ratio  2.8     TSH:  No results for input(s): "TSH" in the last 2160 hours.  A1C:  Recent Labs   Lab Result Units 04/17/25  1030   Hemoglobin A1c % 5.8*       Radiology:        Assessment:     Merry Terrazas is a 75 y.o.female with:    Hypertension, renal disease, stage 1-4 or unspecified chronic kidney disease    Depression, major, recurrent, mild    Left-sided carotid artery disease, unspecified type    Osteoporosis, unspecified osteoporosis type, unspecified pathological fracture presence    Borderline hyperlipidemia    Stage 3b chronic kidney disease          Plan:     Problem List Items Addressed This Visit       Hypertension, renal disease, stage 1-4 or unspecified chronic kidney disease - Primary    Overview    losartan hctz 100/12.5 tolerating well and blood pressure well controlled          " Depression, major, recurrent, mild    Overview   Is off celexa due to some issues related to teeth . She feels ok currently          Borderline hyperlipidemia    Overview   Cont current regimen lipitor 80 mg          Left-sided carotid artery disease    Overview   S/p cea with Dr watters ct surg . Stable          Osteoporosis    Overview   Cont calcium with d          Stage 3b chronic kidney disease    Overview   Stable             As above, continue current medications and maintain follow up with specialists.  Return to clinic in 6 months.      Frederick W Dantagnan [facility]                       [1]   Social History  Socioeconomic History    Marital status: Other   Tobacco Use    Smoking status: Never    Smokeless tobacco: Never   Substance and Sexual Activity    Alcohol use: Yes     Alcohol/week: 4.0 standard drinks of alcohol     Types: 4 Glasses of wine per week     Comment: wine most days    Drug use: Never    Sexual activity: Not Currently     Partners: Male     Birth control/protection: Post-menopausal     Social Drivers of Health     Financial Resource Strain: Low Risk  (11/11/2024)    Overall Financial Resource Strain (CARDIA)     Difficulty of Paying Living Expenses: Not hard at all   Food Insecurity: No Food Insecurity (11/11/2024)    Hunger Vital Sign     Worried About Running Out of Food in the Last Year: Never true     Ran Out of Food in the Last Year: Never true   Transportation Needs: No Transportation Needs (12/3/2019)    PRAPARE - Transportation     Lack of Transportation (Medical): No     Lack of Transportation (Non-Medical): No   Physical Activity: Insufficiently Active (11/11/2024)    Exercise Vital Sign     Days of Exercise per Week: 3 days     Minutes of Exercise per Session: 30 min   Stress: No Stress Concern Present (11/11/2024)    [employer] - Occupational Stress Questionnaire     Feeling of Stress : Only a little    Housing Stability: Unknown (11/11/2024)    Housing Stability Vital Sign     Unable to Pay for Housing in the Last Year: No   [2]   Outpatient Encounter Medications as of 7/1/2025   Medication Sig Dispense Refill    amLODIPine (NORVASC) 5 MG tablet TAKE 1 TABLET (5 MG TOTAL) BY MOUTH ONCE DAILY. 90 tablet 1    aspirin (ASPIR-81 ORAL) Take by mouth.      atorvastatin (LIPITOR) 80 MG tablet TAKE ONE DAILY 90 tablet 3    cetirizine (ZYRTEC) 10 MG tablet Take 1 tablet (10 mg total) by mouth once daily. 30 tablet 0    latanoprost 0.005 % ophthalmic solution   0    losartan-hydrochlorothiazide 100-25 mg (HYZAAR) 100-25 mg per tablet TAKE 1 TABLET BY MOUTH ONCE DAILY. 90 tablet 3    vit C/vit E acet/lutein/min (OCUVITE LUTEIN ORAL) Take by mouth.      vitamin D (VITAMIN D3) 1000 units Tab Take 1,000 Units by mouth once daily.      Covid-19 vaccine, AD25.COV2.S (AYANA, COVID-19,) 0.5 mL injection Inject into the muscle. (Patient not taking: Reported on 7/1/2025) 0.5 mL 0    [DISCONTINUED] amLODIPine (NORVASC) 5 MG tablet TAKE 1 TABLET (5 MG TOTAL) BY MOUTH ONCE DAILY. 90 tablet 3    [DISCONTINUED] citalopram (CELEXA) 20 MG tablet TAKE ONE AND ONE-HALF BY MOUTH DAILY 135 tablet 3    [DISCONTINUED] citalopram (CELEXA) 20 MG tablet TAKE ONE AND ONE-HALF BY MOUTH DAILY 135 tablet 1     No facility-administered encounter medications on file as of 7/1/2025.

## (undated) DEVICE — PAD ABD 8X10 STERILE

## (undated) DEVICE — PAD CAST SPECIALIST STRL 6

## (undated) DEVICE — COVER MAYO STAND REINFRCD 30

## (undated) DEVICE — UNDERGLOVES BIOGEL PI SIZE 8

## (undated) DEVICE — ELECTRODE REM PLYHSV RETURN 9

## (undated) DEVICE — SPLINT PLASTER FAST SET 5X30IN

## (undated) DEVICE — SUT 3/0 18IN PDS II CLR MON

## (undated) DEVICE — SEE MEDLINE ITEM 146231

## (undated) DEVICE — BIT DRILL QUICK RELEASE 2.0MM

## (undated) DEVICE — ALCOHOL ISOPROPYL BLU 70% 16OZ

## (undated) DEVICE — SEE MEDLINE ITEM 146308

## (undated) DEVICE — SPONGE COTTON TRAY 4X4IN

## (undated) DEVICE — SOL 9P NACL IRR PIC IL

## (undated) DEVICE — GUIDE WIRE INSTRUMENT .062 X 6
Type: IMPLANTABLE DEVICE | Site: ELBOW | Status: NON-FUNCTIONAL
Removed: 2021-08-04

## (undated) DEVICE — GLOVE BIOGEL SKINSENSE PI 7.5

## (undated) DEVICE — STAPLER SKIN ROTATING HEAD

## (undated) DEVICE — APPLICATOR CHLORAPREP ORN 26ML

## (undated) DEVICE — DRAPE C-ARM MINI DISP

## (undated) DEVICE — DRAPE INCISE IOBAN 2 23X17IN

## (undated) DEVICE — PACK UPPER EXTREMITY BAPTIST

## (undated) DEVICE — SEE MEDLINE ITEM 152622

## (undated) DEVICE — CORD FOR BIPOLAR FORCEPS 12

## (undated) DEVICE — DRESSING XEROFORM FOIL PK 1X8

## (undated) DEVICE — SUT PROLENE 4-0 MONO 18IN